# Patient Record
Sex: FEMALE | Race: WHITE | NOT HISPANIC OR LATINO | Employment: UNEMPLOYED | URBAN - NONMETROPOLITAN AREA
[De-identification: names, ages, dates, MRNs, and addresses within clinical notes are randomized per-mention and may not be internally consistent; named-entity substitution may affect disease eponyms.]

---

## 2017-01-11 ENCOUNTER — OFFICE VISIT (OUTPATIENT)
Dept: FAMILY MEDICINE CLINIC | Facility: CLINIC | Age: 52
End: 2017-01-11

## 2017-01-11 VITALS
HEART RATE: 97 BPM | HEIGHT: 66 IN | BODY MASS INDEX: 25.39 KG/M2 | TEMPERATURE: 97.5 F | OXYGEN SATURATION: 98 % | WEIGHT: 158 LBS | SYSTOLIC BLOOD PRESSURE: 114 MMHG | DIASTOLIC BLOOD PRESSURE: 70 MMHG

## 2017-01-11 DIAGNOSIS — F32.A DEPRESSION, UNSPECIFIED DEPRESSION TYPE: ICD-10-CM

## 2017-01-11 DIAGNOSIS — G47.00 INSOMNIA, UNSPECIFIED TYPE: ICD-10-CM

## 2017-01-11 DIAGNOSIS — F41.9 ANXIETY: Primary | ICD-10-CM

## 2017-01-11 PROCEDURE — 99213 OFFICE O/P EST LOW 20 MIN: CPT | Performed by: NURSE PRACTITIONER

## 2017-01-11 RX ORDER — QUETIAPINE FUMARATE 50 MG/1
100 TABLET, EXTENDED RELEASE ORAL NIGHTLY
Qty: 60 EACH | Refills: 1 | Status: SHIPPED | OUTPATIENT
Start: 2017-01-11 | End: 2017-03-01

## 2017-01-11 NOTE — MR AVS SNAPSHOT
Blaire Sullvian   1/11/2017 2:30 PM   Office Visit    Dept Phone:  [phone]   Encounter #:  [id number]    Provider:  ALBERTO Morgan   Department:  [facility]                Your Full Care Plan              Today's Medication Changes          These changes are accurate as of: 1/11/17  3:19 PM.  If you have any questions, ask your nurse or doctor.               New Medication(s)Ordered:     QUEtiapine fumarate ER 50 MG tablet sustained-release 24 hour tablet   Commonly known as:  SEROQUEL XR   Take 2 tablets by mouth Every Night.   Started by:  ALBERTO Morgan            Where to Get Your Medications      These medications were sent to [facility] [address] - [phone] [facility] [phone] [address] [address]     Phone:  [phone]     QUEtiapine fumarate ER 50 MG tablet sustained-release 24 hour tablet    Vortioxetine HBr 20 MG tablet                  Your Updated Medication List          This list is accurate as of: 1/11/17  3:19 PM.  Always use your most recent med list.                ALPRAZolam 1 MG tablet   Commonly known as:  XANAX   Take 1 tablet by mouth Every Night.       atenolol 50 MG tablet   Commonly known as:  TENORMIN   Take 1 tablet by mouth daily.       busPIRone 5 MG tablet   Commonly known as:  BUSPAR   Take 1 tablet by mouth 3 (three) times a day.       cyclobenzaprine 10 MG tablet   Commonly known as:  FLEXERIL       HYDROcodone-acetaminophen 7.5-325 MG per tablet   Commonly known as:  NORCO   Take 1 tablet by mouth Every 4 (Four) Hours As Needed for moderate pain (4-6).       meloxicam 15 MG tablet   Commonly known as:  MOBIC   Take 1 tablet by mouth daily.       pravastatin 80 MG tablet   Commonly known as:  PRAVACHOL   Take 1 tablet by mouth every night.       QUEtiapine fumarate ER 50 MG tablet sustained-release 24 hour tablet   Commonly known as:   SEROQUEL XR   Take 2 tablets by mouth Every Night.       rizatriptan 10 MG tablet   Commonly known as:  MAXALT   Take 1 tablet by mouth As Needed for migraine. May repeat in 2 hours if needed       triamcinolone 0.5 % cream   Commonly known as:  KENALOG       Vortioxetine HBr 20 MG tablet   Take 20 mg by mouth Daily.               You Were Diagnosed With        Codes Comments    Anxiety    -  Primary ICD-10-CM: F41.9  ICD-9-CM: 300.00     Depression, unspecified depression type     ICD-10-CM: F32.9  ICD-9-CM: 311     Insomnia, unspecified type     ICD-10-CM: G47.00  ICD-9-CM: 780.52       Instructions     None    Patient Instructions History      Upcoming Appointments     Visit Type Date Time Department    OFFICE VISIT 1/11/2017  2:30 PM [id number]    OFFICE VISIT 2/8/2017  1:30 PM [facility]    FOLLOW UP 3/10/2017  2:30 PM [id number]     Our records indicate that you have an active [employer] account.    You can view your After Visit Summary by going to [place] and logging in with your [employer] username and password.  If you don't have a [employer] username and password but a parent or guardian has access to your record, the parent or guardian should login with their own [employer] username and password and access your record to view the After Visit Summary.    If you have questions, you can email [email or web address] or call [phone] to talk to our [employer] staff.  Remember, [employer] is NOT to be used for urgent needs.  For medical emergencies, dial 911.               Other Info from Your Visit           Your Appointments     Feb 08, 2017  1:30 PM CST   Office Visit with ALBERTO Morgan   [facility] (--)    [address] [place] [id number] [phone]           Arrive 15 minutes prior to appointment.            Mar 10, 2017  2:30 PM CST   Follow Up with Lennox Mullins MD   [facility]  "[facility] (--)    [address] [place] [id number] [phone]           Arrive 15 minutes prior to appointment.              Allergies     Morphine  Nausea Only    Possible nausea    Morphine And Related      Penicillins  Other (See Comments)    Topamax [Topiramate]  Anxiety    Mental changes      Reason for Visit     Anxiety Follow up on Trintellix. Patient states medication is working for her.      Vital Signs     Blood Pressure Pulse Temperature Height    114/70 (BP Location: Left arm, Patient Position: Sitting, Cuff Size: Adult) 97 97.5 °F (36.4 °C) (Temporal Artery ) 66\" (167.6 cm)    Weight Oxygen Saturation Body Mass Index Smoking Status    158 lb (71.7 kg) 98% 25.5 kg/m2 Former Smoker      Problems and Diagnoses Noted     Anxiety problem    Depression    Difficulty falling or staying asleep        "

## 2017-01-11 NOTE — PROGRESS NOTES
"Chief Complaint   Patient presents with   • Anxiety     Follow up on Trintellix. Patient states medication is working for her.       Subjective   HPI:   Blaire Sullivan is a 51 y.o. female presents to the office for evaluation of:  Anxiety   Presents for follow-up visit. Symptoms include decreased concentration, depressed mood, excessive worry, insomnia, irritability and restlessness. Patient reports no chest pain, dizziness, feeling of choking, hyperventilation, nausea, nervous/anxious behavior, palpitations or suicidal ideas. Symptoms occur most days. The most recent episode lasted 2 days. The severity of symptoms is moderate and interfering with daily activities. The quality of sleep is poor. Nighttime awakenings: several.     Her past medical history is significant for depression. Compliance with medications is %.   Depression   Visit Type: follow-up  Patient presents with the following symptoms: decreased concentration, depressed mood, excessive worry, insomnia, irritability, memory impairment and restlessness.  Patient is not experiencing: chest pain, fatigue, feelings of hopelessness, feelings of worthlessness, hypersomnia, hyperventilation, nervousness/anxiety, palpitations, suicidal ideas, suicidal planning and thoughts of death.  Frequency of symptoms: most days   Severity: moderate   Sleep quality: poor  Nighttime awakenings: one to two  Compliance with medications:  %        She is happy with the progress she has made with her trintellex. She states the holidays were hard on her emotionally, and the anniversary of her mothers death was on 2017. She had to attend the  of her best friends mother who was buried on the same day as her mothers death, and at the same cemetary. She states this was difficult for her, but \"I made it through\". She states she thinks she needs a little more time to get over the saddness that came with the first holiday after losing her mother. She would " like to remain on the trintellix at the same dose for another month, and re-evaluate in 4 weeks. She does c/o insomnia X 1-2 weeks. She has taken seroquel in the past with good relief of insomnia. She is requesting to be restarted on this for a few weeks.     Family History   Problem Relation Age of Onset   • Coronary artery disease Other    • Peripheral vascular disease Mother    • Hypertension Mother    • Arthritis Mother    • Heart failure Mother    • Hyperlipidemia Mother    • Arthritis Father    • Alcohol abuse Father    • No Known Problems Sister      Social History     Social History   • Marital status:      Spouse name: Biju   • Number of children: 0   • Years of education: N/A     Occupational History   • Not on file.     Social History Main Topics   • Smoking status: Former Smoker     Types: Cigarettes   • Smokeless tobacco: Never Used      Comment: Quit apx in 2012   • Alcohol use Yes      Comment: Social   • Drug use: No   • Sexual activity: Yes     Partners: Male     Other Topics Concern   • Not on file     Social History Narrative       Review of Systems   Constitutional: Positive for irritability. Negative for activity change, chills, fatigue, fever and unexpected weight change.   HENT: Negative.  Negative for congestion, ear pain, postnasal drip, rhinorrhea, sinus pressure and sore throat.    Eyes: Negative.  Negative for pain and redness.   Respiratory: Negative.  Negative for cough, chest tightness and wheezing.    Cardiovascular: Negative.  Negative for chest pain, palpitations and leg swelling.   Gastrointestinal: Negative.  Negative for abdominal distention, abdominal pain, constipation, diarrhea, nausea and rectal pain.   Endocrine: Negative.    Genitourinary: Negative.  Negative for decreased urine volume, difficulty urinating, dysuria, flank pain, hematuria and urgency.   Musculoskeletal: Negative.  Negative for gait problem and neck pain.   Skin: Negative.  Negative for rash and  "wound.   Allergic/Immunologic: Negative.    Neurological: Negative.  Negative for dizziness, syncope, weakness, light-headedness, numbness and headaches.   Hematological: Negative.    Psychiatric/Behavioral: Positive for decreased concentration, dysphoric mood and sleep disturbance. Negative for agitation, behavioral problems, self-injury and suicidal ideas. The patient has insomnia. The patient is not nervous/anxious.      14 Point ROS completed with pertinent positives discussed. All other systems reviewed and are negative.     The following portions of the patient's history were reviewed and updated as appropriate: allergies, current medications, past family history, past medical history, past social history, past surgical history and problem list.    Encounter Vitals:  Vitals:    01/11/17 1440   BP: 114/70   BP Location: Left arm   Patient Position: Sitting   Cuff Size: Adult   Pulse: 97   Temp: 97.5 °F (36.4 °C)   TempSrc: Temporal Artery    SpO2: 98%   Weight: 158 lb (71.7 kg)   Height: 66\" (167.6 cm)   PainSc:   3   PainLoc: Back       Objective:  Physical Exam   Constitutional: She is oriented to person, place, and time. Vital signs are normal. She appears well-developed and well-nourished.   HENT:   Head: Normocephalic and atraumatic.   Right Ear: Tympanic membrane, external ear and ear canal normal.   Left Ear: Tympanic membrane, external ear and ear canal normal.   Nose: Nose normal.   Mouth/Throat: Uvula is midline, oropharynx is clear and moist and mucous membranes are normal. No posterior oropharyngeal edema or posterior oropharyngeal erythema.   Eyes: Lids are normal. Pupils are equal, round, and reactive to light.   Neck: Trachea normal and normal range of motion. Neck supple. No thyroid mass present.   Cardiovascular: Normal rate, regular rhythm, S1 normal, S2 normal, normal heart sounds and intact distal pulses.  Exam reveals no gallop and no friction rub.    No murmur heard.  Pulmonary/Chest: " Effort normal and breath sounds normal. No respiratory distress. She has no wheezes. She has no rales.   Abdominal: Soft. Normal appearance and bowel sounds are normal. She exhibits no mass. There is no rebound and no guarding.   Musculoskeletal: Normal range of motion.   Lymphadenopathy:     She has no cervical adenopathy.   Neurological: She is alert and oriented to person, place, and time. She has normal strength. No cranial nerve deficit or sensory deficit. Gait normal.   Skin: Skin is warm and dry. No rash noted.   Psychiatric: Her speech is normal and behavior is normal. Judgment and thought content normal. Cognition and memory are normal. She exhibits a depressed mood.   Nursing note and vitals reviewed.      Key Imaging/Tracings/POC Testing    Assessment and Plan:  Problem List Items Addressed This Visit        Other    Insomnia    Anxiety - Primary    Depression    Relevant Medications    Vortioxetine HBr 20 MG tablet    QUEtiapine fumarate ER (SEROQUEL XR) 50 MG tablet sustained-release 24 hour tablet        Blaire was seen today for anxiety.    Diagnoses and all orders for this visit:    Anxiety    Depression, unspecified depression type    Insomnia, unspecified type    Other orders  -     Vortioxetine HBr 20 MG tablet; Take 20 mg by mouth Daily.  -     QUEtiapine fumarate ER (SEROQUEL XR) 50 MG tablet sustained-release 24 hour tablet; Take 2 tablets by mouth Every Night.      Side effects of ordered medications discussed with patient.     Additional Notes/Instructions  Patient verbalizes promise to f/u in 3-4 weeks to re-evaluate depression, anxiety, and insomnia    Follow-Up  Return in about 4 weeks (around 2/8/2017), or if symptoms worsen or fail to improve.    Patient/caregiver verbalizes understanding of all orders and instructions in this plan of care.

## 2017-01-16 RX ORDER — HYDROCODONE BITARTRATE AND ACETAMINOPHEN 7.5; 325 MG/1; MG/1
1 TABLET ORAL EVERY 4 HOURS PRN
Qty: 120 TABLET | Refills: 0 | Status: SHIPPED | OUTPATIENT
Start: 2017-01-16 | End: 2017-02-27 | Stop reason: SDUPTHER

## 2017-01-17 RX ORDER — CYCLOBENZAPRINE HCL 10 MG
10 TABLET ORAL 3 TIMES DAILY PRN
Qty: 90 TABLET | Refills: 5 | Status: SHIPPED | OUTPATIENT
Start: 2017-01-17 | End: 2019-08-29

## 2017-02-20 RX ORDER — MELOXICAM 15 MG/1
15 TABLET ORAL DAILY
Qty: 30 TABLET | Refills: 5 | Status: SHIPPED | OUTPATIENT
Start: 2017-02-20 | End: 2017-02-28 | Stop reason: SDUPTHER

## 2017-02-20 RX ORDER — ATENOLOL 50 MG/1
50 TABLET ORAL DAILY
Qty: 30 TABLET | Refills: 5 | Status: SHIPPED | OUTPATIENT
Start: 2017-02-20 | End: 2017-02-28 | Stop reason: SDUPTHER

## 2017-02-27 RX ORDER — HYDROCODONE BITARTRATE AND ACETAMINOPHEN 7.5; 325 MG/1; MG/1
1 TABLET ORAL EVERY 4 HOURS PRN
Qty: 120 TABLET | Refills: 0 | Status: SHIPPED | OUTPATIENT
Start: 2017-02-27 | End: 2017-04-03 | Stop reason: SDUPTHER

## 2017-02-28 RX ORDER — MELOXICAM 15 MG/1
15 TABLET ORAL DAILY
Qty: 30 TABLET | Refills: 5 | Status: SHIPPED | OUTPATIENT
Start: 2017-02-28 | End: 2017-09-13 | Stop reason: SDUPTHER

## 2017-02-28 RX ORDER — ATENOLOL 50 MG/1
50 TABLET ORAL DAILY
Qty: 30 TABLET | Refills: 5 | Status: SHIPPED | OUTPATIENT
Start: 2017-02-28 | End: 2017-09-13 | Stop reason: SDUPTHER

## 2017-03-01 ENCOUNTER — OFFICE VISIT (OUTPATIENT)
Dept: FAMILY MEDICINE CLINIC | Facility: CLINIC | Age: 52
End: 2017-03-01

## 2017-03-01 VITALS
HEART RATE: 98 BPM | HEIGHT: 66 IN | WEIGHT: 160 LBS | TEMPERATURE: 95.4 F | BODY MASS INDEX: 25.71 KG/M2 | SYSTOLIC BLOOD PRESSURE: 112 MMHG | DIASTOLIC BLOOD PRESSURE: 80 MMHG | OXYGEN SATURATION: 96 %

## 2017-03-01 DIAGNOSIS — G47.00 INSOMNIA, UNSPECIFIED TYPE: ICD-10-CM

## 2017-03-01 DIAGNOSIS — F41.9 ANXIETY: ICD-10-CM

## 2017-03-01 DIAGNOSIS — F32.A DEPRESSION, UNSPECIFIED DEPRESSION TYPE: Primary | ICD-10-CM

## 2017-03-01 PROCEDURE — 99213 OFFICE O/P EST LOW 20 MIN: CPT | Performed by: NURSE PRACTITIONER

## 2017-03-01 RX ORDER — ARIPIPRAZOLE 2 MG/1
2 TABLET ORAL DAILY
Qty: 30 TABLET | Refills: 1 | Status: SHIPPED | OUTPATIENT
Start: 2017-03-01 | End: 2017-03-15 | Stop reason: DRUGHIGH

## 2017-03-01 RX ORDER — ALPRAZOLAM 2 MG/1
2 TABLET ORAL NIGHTLY PRN
Qty: 30 TABLET | Refills: 2 | Status: SHIPPED | OUTPATIENT
Start: 2017-03-01 | End: 2017-04-03 | Stop reason: SDUPTHER

## 2017-03-01 RX ORDER — HYDROXYZINE PAMOATE 50 MG/1
50 CAPSULE ORAL NIGHTLY
Qty: 30 CAPSULE | Refills: 1 | Status: SHIPPED | OUTPATIENT
Start: 2017-03-01 | End: 2017-04-28 | Stop reason: SDUPTHER

## 2017-03-01 NOTE — PROGRESS NOTES
"Chief Complaint   Patient presents with   • Anxiety     f/u   • Depression     f/u       Subjective   HPI   Blaire Sullivan is a 51 y.o. female presents to the office for evaluation of:  Anxiety   Presents for follow-up visit. Symptoms include depressed mood, excessive worry, insomnia, irritability and nervous/anxious behavior. Patient reports no chest pain, compulsions, confusion, decreased concentration, dizziness, dry mouth, feeling of choking, hyperventilation, impotence, malaise, muscle tension, nausea, obsessions, palpitations, panic, restlessness, shortness of breath or suicidal ideas. Symptoms occur most days. The severity of symptoms is causing significant distress. The quality of sleep is poor. Nighttime awakenings: several.     Her past medical history is significant for depression. Compliance with medications is %.   Depression   Visit Type: follow-up  Patient presents with the following symptoms: depressed mood, excessive worry, fatigue, insomnia, irritability and nervousness/anxiety.  Patient is not experiencing: anhedonia, chest pain, choking sensation, compulsions, confusion, decreased concentration, dizziness, dry mouth, feelings of worthlessness, hyperventilation, impotence, malaise, muscle tension, nausea, obsessions, palpitations, panic, restlessness, shortness of breath, suicidal ideas, suicidal planning, thoughts of death, weight gain and weight loss.  Frequency of symptoms: most days   Severity: causing significant distress   Sleep per night: 4 hours  Sleep quality: poor  Nighttime awakenings: several  Compliance with medications:  %  Side effects:  Insomnia    She would like to change her antidepressant medication. She expresses concern that her trintellex is no longer working.  She states she feels \"down\" most days.     Family History   Problem Relation Age of Onset   • Coronary artery disease Other    • Peripheral vascular disease Mother    • Hypertension Mother    • " Arthritis Mother    • Heart failure Mother    • Hyperlipidemia Mother    • Arthritis Father    • Alcohol abuse Father    • No Known Problems Sister      Social History     Social History   • Marital status:      Spouse name: Biju   • Number of children: 0   • Years of education: N/A     Occupational History   • Not on file.     Social History Main Topics   • Smoking status: Former Smoker     Types: Cigarettes   • Smokeless tobacco: Never Used      Comment: Quit apx in 2012   • Alcohol use Yes      Comment: Social   • Drug use: No   • Sexual activity: Yes     Partners: Male     Other Topics Concern   • Not on file     Social History Narrative       Review of Systems   Constitutional: Positive for irritability. Negative for activity change, chills, fatigue, fever, unexpected weight change, weight gain and weight loss.   HENT: Negative.  Negative for congestion, ear pain, postnasal drip, rhinorrhea, sinus pressure and sore throat.    Eyes: Negative.  Negative for pain and redness.   Respiratory: Negative.  Negative for cough, choking, chest tightness, shortness of breath and wheezing.    Cardiovascular: Negative.  Negative for chest pain, palpitations and leg swelling.   Gastrointestinal: Negative.  Negative for abdominal distention, abdominal pain, constipation, diarrhea, nausea and rectal pain.   Endocrine: Negative.    Genitourinary: Negative.  Negative for decreased urine volume, difficulty urinating, dysuria, flank pain, hematuria, impotence and urgency.   Musculoskeletal: Negative.  Negative for gait problem and neck pain.   Skin: Negative.  Negative for rash and wound.   Allergic/Immunologic: Negative.    Neurological: Negative.  Negative for dizziness, syncope, weakness, light-headedness, numbness and headaches.   Hematological: Negative.    Psychiatric/Behavioral: Positive for dysphoric mood. Negative for agitation, behavioral problems, confusion, decreased concentration, self-injury, sleep  "disturbance and suicidal ideas. The patient is nervous/anxious and has insomnia.      14 Point ROS completed with pertinent positives discussed. All other systems reviewed and are negative.     The following portions of the patient's history were reviewed and updated as appropriate: allergies, current medications, past family history, past medical history, past social history, past surgical history and problem list.    Encounter Vitals  Vitals:    03/01/17 1340   BP: 112/80   Pulse: 98   Temp: 95.4 °F (35.2 °C)   SpO2: 96%   Weight: 160 lb (72.6 kg)   Height: 66\" (167.6 cm)   PainSc:   3   PainLoc: Back       Objective:  Physical Exam   Constitutional: She is oriented to person, place, and time. Vital signs are normal. She appears well-developed and well-nourished.   HENT:   Head: Normocephalic and atraumatic.   Right Ear: Tympanic membrane, external ear and ear canal normal.   Left Ear: Tympanic membrane, external ear and ear canal normal.   Nose: Nose normal.   Mouth/Throat: Uvula is midline, oropharynx is clear and moist and mucous membranes are normal. No posterior oropharyngeal edema or posterior oropharyngeal erythema.   Eyes: Lids are normal. Pupils are equal, round, and reactive to light.   Neck: Trachea normal and normal range of motion. Neck supple. No thyroid mass present.   Cardiovascular: Normal rate, regular rhythm, S1 normal, S2 normal, normal heart sounds and intact distal pulses.  Exam reveals no gallop and no friction rub.    No murmur heard.  Pulmonary/Chest: Effort normal and breath sounds normal. No respiratory distress. She has no wheezes. She has no rales.   Abdominal: Soft. Normal appearance and bowel sounds are normal. She exhibits no mass. There is no rebound and no guarding.   Musculoskeletal: Normal range of motion.   Lymphadenopathy:     She has no cervical adenopathy.   Neurological: She is alert and oriented to person, place, and time. She has normal strength. No cranial nerve deficit " or sensory deficit. Gait normal.   Skin: Skin is warm and dry. No rash noted.   Psychiatric: Her speech is normal and behavior is normal. Judgment and thought content normal. Her mood appears anxious. Cognition and memory are normal. She exhibits a depressed mood.   Nursing note and vitals reviewed.    Pertinent Labs      Key Imaging/Tracings/POC Testing    Assessment and Plan  Blaire was seen today for anxiety and depression.    Diagnoses and all orders for this visit:    Depression, unspecified depression type    Insomnia, unspecified type    Anxiety    Other orders  -     ARIPiprazole (ABILIFY) 2 MG tablet; Take 1 tablet by mouth Daily.  -     hydrOXYzine (VISTARIL) 50 MG capsule; Take 1 capsule by mouth Every Night.      Side effects of ordered medications discussed with patient.     Additional Notes/Instructions  Stop seroquel  Increase xanax to 2 mg Q HS  Add abilify Q day  Vistaril to use Q HS if insomnia is not relieved by increase in xanax    F/u with Dr. Mullins in 2 weeks to re-assess depression and insomnia    Follow-Up  Return in about 2 weeks (around 3/15/2017), or if symptoms worsen or fail to improve.    Patient/caregiver verbalizes understanding of all orders and instructions in this plan of care.

## 2017-03-15 ENCOUNTER — OFFICE VISIT (OUTPATIENT)
Dept: FAMILY MEDICINE CLINIC | Facility: CLINIC | Age: 52
End: 2017-03-15

## 2017-03-15 VITALS
RESPIRATION RATE: 16 BRPM | BODY MASS INDEX: 25.71 KG/M2 | HEART RATE: 82 BPM | SYSTOLIC BLOOD PRESSURE: 112 MMHG | OXYGEN SATURATION: 98 % | DIASTOLIC BLOOD PRESSURE: 78 MMHG | TEMPERATURE: 98.4 F | HEIGHT: 66 IN | WEIGHT: 160 LBS

## 2017-03-15 DIAGNOSIS — F32.A DEPRESSION, UNSPECIFIED DEPRESSION TYPE: Primary | ICD-10-CM

## 2017-03-15 DIAGNOSIS — G43.809 OTHER TYPE OF MIGRAINE: ICD-10-CM

## 2017-03-15 PROCEDURE — 99213 OFFICE O/P EST LOW 20 MIN: CPT | Performed by: FAMILY MEDICINE

## 2017-03-15 PROCEDURE — 96372 THER/PROPH/DIAG INJ SC/IM: CPT | Performed by: FAMILY MEDICINE

## 2017-03-15 RX ORDER — ONDANSETRON 2 MG/ML
4 INJECTION INTRAMUSCULAR; INTRAVENOUS ONCE
Status: COMPLETED | OUTPATIENT
Start: 2017-03-15 | End: 2017-03-15

## 2017-03-15 RX ORDER — ARIPIPRAZOLE 5 MG/1
5 TABLET ORAL DAILY
Qty: 30 TABLET | Refills: 3 | Status: SHIPPED | OUTPATIENT
Start: 2017-03-15 | End: 2017-08-01 | Stop reason: SDUPTHER

## 2017-03-15 RX ADMIN — ONDANSETRON 4 MG: 2 INJECTION INTRAMUSCULAR; INTRAVENOUS at 14:42

## 2017-03-15 NOTE — PROGRESS NOTES
Subjective   Blaire Sullivan is a 51 y.o. female.   Chief Complaint   Patient presents with   • Depression     2 week f/u from [facility]   • Headache     migraine x4 days        Depression   Visit Type: follow-up  Patient presents with the following symptoms: anhedonia, depressed mood, insomnia and nervousness/anxiety.  Patient is not experiencing: suicidal ideas.  Frequency of symptoms: most days   Severity: mild   Sleep quality: good  Nighttime awakenings: occasional  Compliance with medications:  %  Side effects:  Headaches  Headache    This is a chronic problem. The current episode started in the past 7 days. The problem occurs daily. The problem has been waxing and waning. The pain is located in the left unilateral region. The pain does not radiate. The pain quality is similar to prior headaches. The quality of the pain is described as stabbing. The pain is at a severity of 7/10. The pain is severe. Associated symptoms include insomnia, nausea, phonophobia, photophobia and vomiting. Nothing aggravates the symptoms. She has tried oral narcotics, darkened room and triptans for the symptoms. The treatment provided mild relief.        The following portions of the patient's history were reviewed and updated as appropriate: allergies, current medications, past family history, past medical history, past social history, past surgical history and problem list.    Review of Systems   Constitutional: Negative.    Eyes: Positive for photophobia.   Respiratory: Negative.    Cardiovascular: Negative.    Gastrointestinal: Positive for nausea and vomiting.   Endocrine: Negative.    Genitourinary: Negative.    Musculoskeletal: Negative.    Skin: Negative.    Allergic/Immunologic: Negative.    Neurological: Positive for headaches.   Hematological: Negative.    Psychiatric/Behavioral: Negative for suicidal ideas. The patient is nervous/anxious and has insomnia.    All other systems reviewed and are negative.      Objective    Physical Exam   Constitutional: She is oriented to person, place, and time. She appears well-developed and well-nourished.   HENT:   Head: Normocephalic and atraumatic.   Right Ear: External ear normal.   Left Ear: External ear normal.   Nose: Nose normal.   Mouth/Throat: Oropharynx is clear and moist.   Eyes: Conjunctivae and EOM are normal. Pupils are equal, round, and reactive to light.   Neck: Normal range of motion. Neck supple.   Cardiovascular: Normal rate, regular rhythm, normal heart sounds and intact distal pulses.  Exam reveals no gallop and no friction rub.    No murmur heard.  Pulmonary/Chest: Effort normal and breath sounds normal. She has no wheezes. She has no rales.   Abdominal: Soft. Bowel sounds are normal. She exhibits no mass. There is no tenderness. There is no rebound and no guarding.   Musculoskeletal: Normal range of motion.   Neurological: She is alert and oriented to person, place, and time. She has normal reflexes. No cranial nerve deficit. She exhibits normal muscle tone.   Skin: Skin is warm and dry. No rash noted.   Psychiatric: She has a normal mood and affect. Her behavior is normal. Judgment and thought content normal.   Nursing note and vitals reviewed.      Assessment/Plan   Blaire was seen today for depression and headache.    Diagnoses and all orders for this visit:    Depression, unspecified depression type    Other type of migraine  -     ketorolac (TORADOL) injection 60 mg; Inject 2 mL into the shoulder, thigh, or buttocks 1 (One) Time.  -     ondansetron (ZOFRAN) injection 4 mg; Inject 2 mL into the shoulder, thigh, or buttocks 1 (One) Time.    Other orders  -     ARIPiprazole (ABILIFY) 5 MG tablet; Take 1 tablet by mouth Daily.

## 2017-03-29 RX ORDER — BUSPIRONE HYDROCHLORIDE 5 MG/1
5 TABLET ORAL 3 TIMES DAILY
Qty: 90 TABLET | Refills: 5 | Status: SHIPPED | OUTPATIENT
Start: 2017-03-29 | End: 2017-10-09 | Stop reason: SDUPTHER

## 2017-03-29 RX ORDER — PRAVASTATIN SODIUM 80 MG/1
80 TABLET ORAL NIGHTLY
Qty: 30 TABLET | Refills: 5 | Status: SHIPPED | OUTPATIENT
Start: 2017-03-29 | End: 2017-09-29 | Stop reason: SDUPTHER

## 2017-04-03 ENCOUNTER — OFFICE VISIT (OUTPATIENT)
Dept: FAMILY MEDICINE CLINIC | Facility: CLINIC | Age: 52
End: 2017-04-03

## 2017-04-03 VITALS
HEART RATE: 84 BPM | TEMPERATURE: 98.2 F | HEIGHT: 66 IN | SYSTOLIC BLOOD PRESSURE: 112 MMHG | DIASTOLIC BLOOD PRESSURE: 84 MMHG | BODY MASS INDEX: 25.71 KG/M2 | WEIGHT: 160 LBS

## 2017-04-03 DIAGNOSIS — F32.A DEPRESSION, UNSPECIFIED DEPRESSION TYPE: ICD-10-CM

## 2017-04-03 DIAGNOSIS — M16.9 OSTEOARTHRITIS OF HIP, UNSPECIFIED LATERALITY, UNSPECIFIED OSTEOARTHRITIS TYPE: Primary | ICD-10-CM

## 2017-04-03 DIAGNOSIS — E78.5 HYPERLIPIDEMIA, UNSPECIFIED HYPERLIPIDEMIA TYPE: ICD-10-CM

## 2017-04-03 DIAGNOSIS — F41.9 ANXIETY: ICD-10-CM

## 2017-04-03 PROCEDURE — 99214 OFFICE O/P EST MOD 30 MIN: CPT | Performed by: FAMILY MEDICINE

## 2017-04-03 RX ORDER — ALPRAZOLAM 2 MG/1
2 TABLET ORAL NIGHTLY PRN
Qty: 30 TABLET | Refills: 2 | Status: SHIPPED | OUTPATIENT
Start: 2017-04-03 | End: 2017-07-14 | Stop reason: DRUGHIGH

## 2017-04-03 RX ORDER — HYDROCODONE BITARTRATE AND ACETAMINOPHEN 7.5; 325 MG/1; MG/1
1 TABLET ORAL EVERY 4 HOURS PRN
Qty: 120 TABLET | Refills: 0 | Status: SHIPPED | OUTPATIENT
Start: 2017-04-03 | End: 2017-05-08 | Stop reason: SDUPTHER

## 2017-04-03 NOTE — PROGRESS NOTES
Subjective   Blaire Sullivan is a 51 y.o. female who presents to the office for follow-up   History of Present Illness   Patient with history of depression, anxiety, and osteoarthritis is in today for reevaluation and refill of controlled medications.  Patient states that medications are still effective providing at least 30% relief for pain and she is tolerating without side effect.  She is requesting some lab work being done for her hyperlipidemia.  She denies chest pain PND orthopnea.  No neurological symptoms  The following portions of the patient's history were reviewed and updated as appropriate: allergies, current medications, past family history, past medical history, past social history, past surgical history and problem list.    Review of Systems   Constitutional: Positive for fatigue.   HENT: Negative.    Eyes: Negative.    Respiratory: Negative.    Cardiovascular: Negative.    Gastrointestinal: Negative.    Endocrine: Negative.    Genitourinary: Negative.    Musculoskeletal: Positive for arthralgias.   Skin: Negative.    Allergic/Immunologic: Negative.    Neurological: Negative.    Hematological: Negative.    Psychiatric/Behavioral: Positive for dysphoric mood. The patient is nervous/anxious.    All other systems reviewed and are negative.      Objective   Physical Exam   Constitutional: She is oriented to person, place, and time. She appears well-developed and well-nourished.   HENT:   Head: Normocephalic and atraumatic.   Right Ear: External ear normal.   Left Ear: External ear normal.   Nose: Nose normal.   Mouth/Throat: Oropharynx is clear and moist.   Eyes: Conjunctivae and EOM are normal. Pupils are equal, round, and reactive to light.   Neck: Normal range of motion. Neck supple.   Cardiovascular: Normal rate, regular rhythm, normal heart sounds and intact distal pulses.  Exam reveals no gallop and no friction rub.    No murmur heard.  Pulmonary/Chest: Effort normal and breath sounds normal. She  has no wheezes. She has no rales.   Abdominal: Soft. Bowel sounds are normal. She exhibits no mass. There is no tenderness. There is no rebound and no guarding.   Musculoskeletal:        Right hip: She exhibits decreased range of motion and laceration.        Left hip: She exhibits decreased range of motion and laceration.   Neurological: She is alert and oriented to person, place, and time. She has normal reflexes. No cranial nerve deficit. She exhibits normal muscle tone.   Skin: Skin is warm and dry. No rash noted.   Psychiatric: Her behavior is normal. Judgment and thought content normal. Her mood appears anxious.   Nursing note and vitals reviewed.    renay reviewed    Assessment/Plan   Blaire was seen today for med refill, osteoarthritis and anxiety.    Diagnoses and all orders for this visit:    Osteoarthritis of hip, unspecified laterality, unspecified osteoarthritis type  -     CBC & Differential; Future  -     Urine Drug Screen  -     Vitamin D 25 Hydroxy; Future    Anxiety  -     Urine Drug Screen    Depression, unspecified depression type  -     TSH; Future    Hyperlipidemia, unspecified hyperlipidemia type  -     Comprehensive Metabolic Panel; Future  -     Lipid Panel; Future  -     TSH; Future  -     Vitamin D 25 Hydroxy; Future    Other orders  -     HYDROcodone-acetaminophen (NORCO) 7.5-325 MG per tablet; Take 1 tablet by mouth Every 4 (Four) Hours As Needed for Moderate Pain (4-6).  -     ALPRAZolam (XANAX) 2 MG tablet; Take 1 tablet by mouth At Night As Needed for Anxiety.             No flowsheet data found.

## 2017-04-21 RX ORDER — RIZATRIPTAN BENZOATE 10 MG/1
10 TABLET ORAL AS NEEDED
Qty: 9 TABLET | Refills: 5 | Status: SHIPPED | OUTPATIENT
Start: 2017-04-21 | End: 2018-01-05 | Stop reason: SDUPTHER

## 2017-04-28 RX ORDER — HYDROXYZINE PAMOATE 50 MG/1
50 CAPSULE ORAL NIGHTLY
Qty: 30 CAPSULE | Refills: 1 | Status: SHIPPED | OUTPATIENT
Start: 2017-04-28 | End: 2017-06-29 | Stop reason: SDUPTHER

## 2017-05-01 ENCOUNTER — OFFICE VISIT (OUTPATIENT)
Dept: FAMILY MEDICINE CLINIC | Facility: CLINIC | Age: 52
End: 2017-05-01

## 2017-05-01 VITALS
WEIGHT: 168 LBS | DIASTOLIC BLOOD PRESSURE: 62 MMHG | SYSTOLIC BLOOD PRESSURE: 118 MMHG | TEMPERATURE: 97.2 F | OXYGEN SATURATION: 97 % | BODY MASS INDEX: 27 KG/M2 | HEIGHT: 66 IN | RESPIRATION RATE: 16 BRPM | HEART RATE: 92 BPM

## 2017-05-01 DIAGNOSIS — F41.9 ANXIETY: ICD-10-CM

## 2017-05-01 DIAGNOSIS — F32.A DEPRESSION, UNSPECIFIED DEPRESSION TYPE: Primary | ICD-10-CM

## 2017-05-01 PROCEDURE — 99213 OFFICE O/P EST LOW 20 MIN: CPT | Performed by: FAMILY MEDICINE

## 2017-05-08 RX ORDER — HYDROCODONE BITARTRATE AND ACETAMINOPHEN 7.5; 325 MG/1; MG/1
1 TABLET ORAL EVERY 4 HOURS PRN
Qty: 120 TABLET | Refills: 0 | Status: SHIPPED | OUTPATIENT
Start: 2017-05-08 | End: 2017-06-12 | Stop reason: SDUPTHER

## 2017-06-12 RX ORDER — HYDROCODONE BITARTRATE AND ACETAMINOPHEN 7.5; 325 MG/1; MG/1
1 TABLET ORAL EVERY 4 HOURS PRN
Qty: 120 TABLET | Refills: 0 | Status: SHIPPED | OUTPATIENT
Start: 2017-06-12 | End: 2017-07-14 | Stop reason: SDUPTHER

## 2017-06-19 ENCOUNTER — OFFICE VISIT (OUTPATIENT)
Dept: FAMILY MEDICINE CLINIC | Facility: CLINIC | Age: 52
End: 2017-06-19

## 2017-06-19 VITALS
DIASTOLIC BLOOD PRESSURE: 90 MMHG | OXYGEN SATURATION: 92 % | HEART RATE: 76 BPM | WEIGHT: 170 LBS | RESPIRATION RATE: 18 BRPM | TEMPERATURE: 97.2 F | BODY MASS INDEX: 27.32 KG/M2 | SYSTOLIC BLOOD PRESSURE: 120 MMHG | HEIGHT: 66 IN

## 2017-06-19 DIAGNOSIS — M16.9 OSTEOARTHRITIS OF HIP, UNSPECIFIED LATERALITY, UNSPECIFIED OSTEOARTHRITIS TYPE: Primary | ICD-10-CM

## 2017-06-19 DIAGNOSIS — M51.35 DDD (DEGENERATIVE DISC DISEASE), THORACOLUMBAR: ICD-10-CM

## 2017-06-19 DIAGNOSIS — M17.0 OSTEOARTHRITIS OF BOTH KNEES, UNSPECIFIED OSTEOARTHRITIS TYPE: ICD-10-CM

## 2017-06-19 PROCEDURE — 99212 OFFICE O/P EST SF 10 MIN: CPT | Performed by: FAMILY MEDICINE

## 2017-06-19 NOTE — PROGRESS NOTES
Subjective   Blaire Sullivan is a 51 y.o. female.   Chief Complaint   Patient presents with   • paperwork     disability        History of Present Illness   Patient with history of osteoarthritis and degenerative disc disease in today for reevaluation.  She's requesting disability paperwork to be completed.    The following portions of the patient's history were reviewed and updated as appropriate: allergies, current medications, past family history, past medical history, past social history, past surgical history and problem list.    Review of Systems   Constitutional: Negative.    HENT: Negative.    Eyes: Negative.    Respiratory: Negative.    Cardiovascular: Negative.    Gastrointestinal: Negative.    Endocrine: Negative.    Genitourinary: Negative.    Musculoskeletal: Positive for arthralgias and back pain.   Skin: Negative.    Allergic/Immunologic: Negative.    Neurological: Negative.    Hematological: Negative.    Psychiatric/Behavioral: Negative for decreased concentration and suicidal ideas. The patient is nervous/anxious.    All other systems reviewed and are negative.      Objective   Physical Exam   Constitutional: She is oriented to person, place, and time. She appears well-developed and well-nourished.   HENT:   Head: Normocephalic and atraumatic.   Right Ear: External ear normal.   Left Ear: External ear normal.   Nose: Nose normal.   Mouth/Throat: Oropharynx is clear and moist.   Eyes: Conjunctivae and EOM are normal. Pupils are equal, round, and reactive to light.   Neck: Normal range of motion. Neck supple.   Cardiovascular: Normal rate, regular rhythm, normal heart sounds and intact distal pulses.  Exam reveals no gallop and no friction rub.    No murmur heard.  Pulmonary/Chest: Effort normal and breath sounds normal. She has no wheezes. She has no rales.   Abdominal: Soft. Bowel sounds are normal. She exhibits no mass. There is no tenderness. There is no rebound and no guarding.    Musculoskeletal:        Right hip: She exhibits decreased range of motion and laceration.        Left hip: She exhibits laceration.        Right knee: Tenderness found.        Left knee: Tenderness found.        Lumbar back: She exhibits decreased range of motion, tenderness and laceration.   Neurological: She is alert and oriented to person, place, and time. She has normal reflexes. No cranial nerve deficit. She exhibits normal muscle tone.   Skin: Skin is warm and dry. No rash noted.   Psychiatric: She has a normal mood and affect. Her behavior is normal. Judgment and thought content normal.   Nursing note and vitals reviewed.      Assessment/Plan   Blaire was seen today for paperwork.    Diagnoses and all orders for this visit:    Osteoarthritis of hip, unspecified laterality, unspecified osteoarthritis type    DDD (degenerative disc disease), thoracolumbar    Osteoarthritis of both knees, unspecified osteoarthritis type        Paperwork completed

## 2017-06-29 RX ORDER — HYDROXYZINE PAMOATE 50 MG/1
50 CAPSULE ORAL NIGHTLY
Qty: 30 CAPSULE | Refills: 5 | Status: SHIPPED | OUTPATIENT
Start: 2017-06-29 | End: 2017-08-07

## 2017-07-14 ENCOUNTER — OFFICE VISIT (OUTPATIENT)
Dept: FAMILY MEDICINE CLINIC | Facility: CLINIC | Age: 52
End: 2017-07-14

## 2017-07-14 VITALS
WEIGHT: 168 LBS | OXYGEN SATURATION: 92 % | TEMPERATURE: 96.5 F | HEART RATE: 78 BPM | BODY MASS INDEX: 27 KG/M2 | DIASTOLIC BLOOD PRESSURE: 80 MMHG | SYSTOLIC BLOOD PRESSURE: 112 MMHG | HEIGHT: 66 IN

## 2017-07-14 DIAGNOSIS — M16.9 OSTEOARTHRITIS OF HIP, UNSPECIFIED LATERALITY, UNSPECIFIED OSTEOARTHRITIS TYPE: Primary | ICD-10-CM

## 2017-07-14 DIAGNOSIS — G47.00 INSOMNIA, UNSPECIFIED TYPE: ICD-10-CM

## 2017-07-14 DIAGNOSIS — M17.0 OSTEOARTHRITIS OF BOTH KNEES, UNSPECIFIED OSTEOARTHRITIS TYPE: ICD-10-CM

## 2017-07-14 PROCEDURE — 99213 OFFICE O/P EST LOW 20 MIN: CPT | Performed by: FAMILY MEDICINE

## 2017-07-14 RX ORDER — ALPRAZOLAM 0.25 MG/1
0.25 TABLET ORAL NIGHTLY PRN
Qty: 30 TABLET | Refills: 0 | Status: SHIPPED | OUTPATIENT
Start: 2017-07-14 | End: 2017-08-07

## 2017-07-14 RX ORDER — HYDROCODONE BITARTRATE AND ACETAMINOPHEN 7.5; 325 MG/1; MG/1
1 TABLET ORAL EVERY 4 HOURS PRN
Qty: 120 TABLET | Refills: 0 | Status: SHIPPED | OUTPATIENT
Start: 2017-07-14 | End: 2017-08-14 | Stop reason: SDUPTHER

## 2017-07-14 NOTE — PROGRESS NOTES
Subjective   Blaire Sullivan is a 51 y.o. female.   Chief Complaint   Patient presents with   • Osteoarthritis     3 month f/u   • Med Refill     refill of hydrocodone         Arthritis   Presents for follow-up visit. She complains of pain. The symptoms have been stable. Affected locations include the right hip, left hip, right knee and left knee. Her pain is at a severity of 5/10. Associated symptoms include fatigue. Compliance with total regimen is %. Compliance with medications is %. Treatment side effects: none.        The following portions of the patient's history were reviewed and updated as appropriate: allergies, current medications, past family history, past medical history, past social history, past surgical history and problem list.    Review of Systems   Constitutional: Positive for fatigue.   HENT: Negative.    Eyes: Negative.    Respiratory: Negative.    Cardiovascular: Negative.    Gastrointestinal: Negative.    Endocrine: Negative.    Genitourinary: Negative.    Musculoskeletal: Positive for arthritis.   Skin: Negative.    Allergic/Immunologic: Negative.    Neurological: Negative.    Hematological: Negative.    Psychiatric/Behavioral: Negative.    All other systems reviewed and are negative.      Objective   Physical Exam   Constitutional: She is oriented to person, place, and time. She appears well-developed and well-nourished.   HENT:   Head: Normocephalic and atraumatic.   Right Ear: External ear normal.   Left Ear: External ear normal.   Nose: Nose normal.   Mouth/Throat: Oropharynx is clear and moist.   Eyes: Conjunctivae and EOM are normal. Pupils are equal, round, and reactive to light.   Neck: Normal range of motion. Neck supple.   Cardiovascular: Normal rate, regular rhythm, normal heart sounds and intact distal pulses.  Exam reveals no gallop and no friction rub.    No murmur heard.  Pulmonary/Chest: Effort normal and breath sounds normal. She has no wheezes. She has no  rales.   Abdominal: Soft. Bowel sounds are normal. She exhibits no mass. There is no tenderness. There is no rebound and no guarding.   Musculoskeletal:        Right hip: She exhibits decreased range of motion and laceration.        Left hip: She exhibits laceration.        Right knee: Tenderness found.        Left knee: Tenderness found.        Lumbar back: She exhibits decreased range of motion, tenderness and laceration.   Neurological: She is alert and oriented to person, place, and time. She has normal reflexes. No cranial nerve deficit. She exhibits normal muscle tone.   Skin: Skin is warm and dry. No rash noted.   Psychiatric: She has a normal mood and affect. Her behavior is normal. Judgment and thought content normal.   Nursing note and vitals reviewed.    Abdiel reviewed    Assessment/Plan   Blaire was seen today for osteoarthritis and med refill.    Diagnoses and all orders for this visit:    Osteoarthritis of hip, unspecified laterality, unspecified osteoarthritis type    Osteoarthritis of both knees, unspecified osteoarthritis type    Insomnia, unspecified type    Other orders  -     HYDROcodone-acetaminophen (NORCO) 7.5-325 MG per tablet; Take 1 tablet by mouth Every 4 (Four) Hours As Needed for Moderate Pain .  -     ALPRAZolam (XANAX) 0.25 MG tablet; Take 1 tablet by mouth At Night As Needed for Sleep.      Discussed interaction between opiates and benzodiazepines and have elected to taper off of the Xanax.

## 2017-08-01 RX ORDER — ARIPIPRAZOLE 5 MG/1
5 TABLET ORAL DAILY
Qty: 30 TABLET | Refills: 3 | Status: SHIPPED | OUTPATIENT
Start: 2017-08-01 | End: 2017-12-08 | Stop reason: SDUPTHER

## 2017-08-07 ENCOUNTER — OFFICE VISIT (OUTPATIENT)
Dept: FAMILY MEDICINE CLINIC | Facility: CLINIC | Age: 52
End: 2017-08-07

## 2017-08-07 VITALS
OXYGEN SATURATION: 95 % | DIASTOLIC BLOOD PRESSURE: 82 MMHG | TEMPERATURE: 97.6 F | RESPIRATION RATE: 16 BRPM | SYSTOLIC BLOOD PRESSURE: 122 MMHG | BODY MASS INDEX: 26.03 KG/M2 | HEIGHT: 66 IN | WEIGHT: 162 LBS | HEART RATE: 96 BPM

## 2017-08-07 DIAGNOSIS — F51.01 PRIMARY INSOMNIA: Primary | ICD-10-CM

## 2017-08-07 PROCEDURE — 99213 OFFICE O/P EST LOW 20 MIN: CPT | Performed by: FAMILY MEDICINE

## 2017-08-07 RX ORDER — MIRTAZAPINE 15 MG/1
15 TABLET, FILM COATED ORAL NIGHTLY
Qty: 30 TABLET | Refills: 3 | Status: SHIPPED | OUTPATIENT
Start: 2017-08-07 | End: 2017-11-27 | Stop reason: SDUPTHER

## 2017-08-07 NOTE — PROGRESS NOTES
Subjective   Blaire Sullivan is a 51 y.o. female.   Chief Complaint   Patient presents with   • Insomnia       Insomnia   This is a chronic problem. The current episode started more than 1 year ago. The problem occurs constantly. The problem has been gradually worsening. Associated symptoms include arthralgias and fatigue. Associated symptoms comments: depression. Exacerbated by: stopping Xanax.        The following portions of the patient's history were reviewed and updated as appropriate: allergies, current medications, past family history, past medical history, past social history, past surgical history and problem list.    Review of Systems   Constitutional: Positive for fatigue.   HENT: Negative.    Eyes: Negative.    Respiratory: Negative.    Cardiovascular: Negative.    Gastrointestinal: Negative.    Endocrine: Negative.    Genitourinary: Negative.    Musculoskeletal: Positive for arthralgias.   Skin: Negative.    Allergic/Immunologic: Negative.    Neurological: Negative.    Hematological: Negative.    Psychiatric/Behavioral: Positive for dysphoric mood and sleep disturbance. The patient is nervous/anxious and has insomnia.    All other systems reviewed and are negative.      Objective   Physical Exam   Constitutional: She is oriented to person, place, and time. She appears well-developed and well-nourished.   HENT:   Head: Normocephalic and atraumatic.   Right Ear: External ear normal.   Left Ear: External ear normal.   Nose: Nose normal.   Mouth/Throat: Oropharynx is clear and moist.   Eyes: Conjunctivae and EOM are normal. Pupils are equal, round, and reactive to light.   Neck: Normal range of motion. Neck supple.   Cardiovascular: Normal rate, regular rhythm, normal heart sounds and intact distal pulses.  Exam reveals no gallop and no friction rub.    No murmur heard.  Pulmonary/Chest: Effort normal and breath sounds normal. She has no wheezes. She has no rales.   Abdominal: Soft. Bowel sounds are  normal. She exhibits no mass. There is no tenderness. There is no rebound and no guarding.   Musculoskeletal:        Right hip: She exhibits decreased range of motion and laceration.        Left hip: She exhibits laceration.        Right knee: Tenderness found.        Left knee: Tenderness found.        Lumbar back: She exhibits decreased range of motion, tenderness and laceration.   Neurological: She is alert and oriented to person, place, and time. She has normal reflexes. No cranial nerve deficit. She exhibits normal muscle tone.   Skin: Skin is warm and dry. No rash noted.   Psychiatric: She has a normal mood and affect. Her behavior is normal. Judgment and thought content normal.   Nursing note and vitals reviewed.      Assessment/Plan   Blaire was seen today for insomnia.    Diagnoses and all orders for this visit:    Primary insomnia    Other orders  -     mirtazapine (REMERON) 15 MG tablet; Take 1 tablet by mouth Every Night.        Melatonin OTC

## 2017-08-14 RX ORDER — HYDROCODONE BITARTRATE AND ACETAMINOPHEN 7.5; 325 MG/1; MG/1
1 TABLET ORAL EVERY 4 HOURS PRN
Qty: 120 TABLET | Refills: 0 | Status: SHIPPED | OUTPATIENT
Start: 2017-08-14 | End: 2017-09-18 | Stop reason: SDUPTHER

## 2017-09-13 RX ORDER — ATENOLOL 50 MG/1
50 TABLET ORAL DAILY
Qty: 30 TABLET | Refills: 5 | Status: SHIPPED | OUTPATIENT
Start: 2017-09-13 | End: 2018-03-23 | Stop reason: SDUPTHER

## 2017-09-13 RX ORDER — MELOXICAM 15 MG/1
15 TABLET ORAL DAILY
Qty: 30 TABLET | Refills: 5 | Status: SHIPPED | OUTPATIENT
Start: 2017-09-13 | End: 2018-03-23 | Stop reason: SDUPTHER

## 2017-09-18 RX ORDER — HYDROCODONE BITARTRATE AND ACETAMINOPHEN 7.5; 325 MG/1; MG/1
1 TABLET ORAL EVERY 4 HOURS PRN
Qty: 120 TABLET | Refills: 0 | Status: SHIPPED | OUTPATIENT
Start: 2017-09-18 | End: 2017-10-18 | Stop reason: SDUPTHER

## 2017-09-29 RX ORDER — PRAVASTATIN SODIUM 80 MG/1
80 TABLET ORAL NIGHTLY
Qty: 30 TABLET | Refills: 5 | Status: SHIPPED | OUTPATIENT
Start: 2017-09-29 | End: 2018-04-20 | Stop reason: SDUPTHER

## 2017-10-09 RX ORDER — BUSPIRONE HYDROCHLORIDE 5 MG/1
5 TABLET ORAL 3 TIMES DAILY
Qty: 90 TABLET | Refills: 5 | Status: SHIPPED | OUTPATIENT
Start: 2017-10-09 | End: 2018-04-20 | Stop reason: SDUPTHER

## 2017-10-18 ENCOUNTER — OFFICE VISIT (OUTPATIENT)
Dept: FAMILY MEDICINE CLINIC | Facility: CLINIC | Age: 52
End: 2017-10-18

## 2017-10-18 VITALS
HEIGHT: 66 IN | SYSTOLIC BLOOD PRESSURE: 116 MMHG | HEART RATE: 81 BPM | OXYGEN SATURATION: 96 % | BODY MASS INDEX: 28.61 KG/M2 | TEMPERATURE: 96.8 F | RESPIRATION RATE: 16 BRPM | DIASTOLIC BLOOD PRESSURE: 80 MMHG | WEIGHT: 178 LBS

## 2017-10-18 DIAGNOSIS — M17.0 OSTEOARTHRITIS OF BOTH KNEES, UNSPECIFIED OSTEOARTHRITIS TYPE: ICD-10-CM

## 2017-10-18 DIAGNOSIS — M16.9 OSTEOARTHRITIS OF HIP, UNSPECIFIED LATERALITY, UNSPECIFIED OSTEOARTHRITIS TYPE: Primary | ICD-10-CM

## 2017-10-18 DIAGNOSIS — F41.9 ANXIETY: ICD-10-CM

## 2017-10-18 DIAGNOSIS — F32.A DEPRESSION, UNSPECIFIED DEPRESSION TYPE: ICD-10-CM

## 2017-10-18 PROCEDURE — 99213 OFFICE O/P EST LOW 20 MIN: CPT | Performed by: FAMILY MEDICINE

## 2017-10-18 RX ORDER — HYDROCODONE BITARTRATE AND ACETAMINOPHEN 7.5; 325 MG/1; MG/1
1 TABLET ORAL EVERY 4 HOURS PRN
Qty: 120 TABLET | Refills: 0 | Status: SHIPPED | OUTPATIENT
Start: 2017-10-18 | End: 2017-11-17 | Stop reason: SDUPTHER

## 2017-10-18 NOTE — PROGRESS NOTES
Subjective   Blaire Sullivan is a 52 y.o. female.   Chief Complaint   Patient presents with   • Osteoarthritis     3 mo control refill on Hydrocodone       Osteoarthritis   This is a chronic problem. The current episode started more than 1 year ago. The problem occurs daily. The problem has been unchanged. Associated symptoms include arthralgias, headaches and neck pain. The symptoms are aggravated by bending, exertion, standing and walking. She has tried NSAIDs and oral narcotics for the symptoms. The treatment provided significant relief.        The following portions of the patient's history were reviewed and updated as appropriate: allergies, current medications, past family history, past medical history, past social history, past surgical history and problem list.    Review of Systems   Constitutional: Negative.    Eyes: Negative.    Respiratory: Negative.    Cardiovascular: Negative.    Gastrointestinal: Negative.    Endocrine: Negative.    Genitourinary: Negative.    Musculoskeletal: Positive for arthralgias and neck pain.   Skin: Negative.    Allergic/Immunologic: Negative.    Neurological: Positive for headaches.   Hematological: Negative.    Psychiatric/Behavioral: Negative.    All other systems reviewed and are negative.      Objective   Physical Exam   Constitutional: She is oriented to person, place, and time. She appears well-developed and well-nourished.   HENT:   Head: Normocephalic and atraumatic.   Right Ear: External ear normal.   Left Ear: External ear normal.   Nose: Nose normal.   Mouth/Throat: Oropharynx is clear and moist.   Eyes: Conjunctivae and EOM are normal. Pupils are equal, round, and reactive to light.   Neck: Normal range of motion. Neck supple.   Cardiovascular: Normal rate, regular rhythm, normal heart sounds and intact distal pulses.  Exam reveals no gallop and no friction rub.    No murmur heard.  Pulmonary/Chest: Effort normal and breath sounds normal. She has no wheezes. She  has no rales.   Abdominal: Soft. Bowel sounds are normal. She exhibits no mass. There is no tenderness. There is no rebound and no guarding.   Musculoskeletal:        Right hip: She exhibits decreased range of motion and laceration.        Left hip: She exhibits laceration.        Right knee: Tenderness found.        Left knee: Tenderness found.        Lumbar back: She exhibits decreased range of motion, tenderness and laceration.   Neurological: She is alert and oriented to person, place, and time. She has normal reflexes. No cranial nerve deficit. She exhibits normal muscle tone.   Skin: Skin is warm and dry. No rash noted.   Psychiatric: She has a normal mood and affect. Her behavior is normal. Judgment and thought content normal.   Nursing note and vitals reviewed.  Abdiel reviewed    Assessment/Plan   Blaire was seen today for osteoarthritis.    Diagnoses and all orders for this visit:    Osteoarthritis of hip, unspecified laterality, unspecified osteoarthritis type    Osteoarthritis of both knees, unspecified osteoarthritis type    Depression, unspecified depression type    Anxiety    Other orders  -     HYDROcodone-acetaminophen (NORCO) 7.5-325 MG per tablet; Take 1 tablet by mouth Every 4 (Four) Hours As Needed for Moderate Pain .

## 2017-11-17 RX ORDER — HYDROCODONE BITARTRATE AND ACETAMINOPHEN 7.5; 325 MG/1; MG/1
1 TABLET ORAL EVERY 4 HOURS PRN
Qty: 120 TABLET | Refills: 0 | Status: SHIPPED | OUTPATIENT
Start: 2017-11-17 | End: 2017-12-20 | Stop reason: SDUPTHER

## 2017-11-27 RX ORDER — MIRTAZAPINE 15 MG/1
15 TABLET, FILM COATED ORAL NIGHTLY
Qty: 30 TABLET | Refills: 3 | Status: SHIPPED | OUTPATIENT
Start: 2017-11-27 | End: 2018-01-23 | Stop reason: DRUGHIGH

## 2017-12-08 RX ORDER — ARIPIPRAZOLE 5 MG/1
5 TABLET ORAL DAILY
Qty: 30 TABLET | Refills: 3 | Status: SHIPPED | OUTPATIENT
Start: 2017-12-08 | End: 2018-01-23 | Stop reason: DRUGHIGH

## 2017-12-20 RX ORDER — HYDROCODONE BITARTRATE AND ACETAMINOPHEN 7.5; 325 MG/1; MG/1
1 TABLET ORAL EVERY 4 HOURS PRN
Qty: 120 TABLET | Refills: 0 | Status: SHIPPED | OUTPATIENT
Start: 2017-12-20 | End: 2018-01-23 | Stop reason: SDUPTHER

## 2018-01-05 RX ORDER — RIZATRIPTAN BENZOATE 10 MG/1
10 TABLET ORAL AS NEEDED
Qty: 9 TABLET | Refills: 5 | Status: SHIPPED | OUTPATIENT
Start: 2018-01-05 | End: 2019-08-29 | Stop reason: SDUPTHER

## 2018-01-23 ENCOUNTER — OFFICE VISIT (OUTPATIENT)
Dept: FAMILY MEDICINE CLINIC | Facility: CLINIC | Age: 53
End: 2018-01-23

## 2018-01-23 VITALS
RESPIRATION RATE: 16 BRPM | HEIGHT: 66 IN | DIASTOLIC BLOOD PRESSURE: 68 MMHG | OXYGEN SATURATION: 97 % | HEART RATE: 80 BPM | SYSTOLIC BLOOD PRESSURE: 114 MMHG | TEMPERATURE: 97.7 F | WEIGHT: 183 LBS | BODY MASS INDEX: 29.41 KG/M2

## 2018-01-23 DIAGNOSIS — M17.0 OSTEOARTHRITIS OF BOTH KNEES, UNSPECIFIED OSTEOARTHRITIS TYPE: Primary | ICD-10-CM

## 2018-01-23 DIAGNOSIS — F32.A DEPRESSION, UNSPECIFIED DEPRESSION TYPE: ICD-10-CM

## 2018-01-23 DIAGNOSIS — F41.9 ANXIETY: ICD-10-CM

## 2018-01-23 PROCEDURE — 99213 OFFICE O/P EST LOW 20 MIN: CPT | Performed by: FAMILY MEDICINE

## 2018-01-23 RX ORDER — ARIPIPRAZOLE 10 MG/1
10 TABLET ORAL DAILY
Qty: 30 TABLET | Refills: 6 | Status: SHIPPED | OUTPATIENT
Start: 2018-01-23 | End: 2018-02-20 | Stop reason: DRUGHIGH

## 2018-01-23 RX ORDER — HYDROCODONE BITARTRATE AND ACETAMINOPHEN 7.5; 325 MG/1; MG/1
1 TABLET ORAL EVERY 4 HOURS PRN
Qty: 120 TABLET | Refills: 0 | Status: SHIPPED | OUTPATIENT
Start: 2018-01-23 | End: 2018-02-16 | Stop reason: SDUPTHER

## 2018-01-23 RX ORDER — MIRTAZAPINE 7.5 MG/1
7.5 TABLET, FILM COATED ORAL NIGHTLY
Qty: 30 TABLET | Refills: 6 | Status: SHIPPED | OUTPATIENT
Start: 2018-01-23 | End: 2018-01-24 | Stop reason: SDUPTHER

## 2018-01-23 NOTE — PROGRESS NOTES
Subjective   Blaire Sullivan is a 52 y.o. female.   Chief Complaint   Patient presents with   • Osteoarthritis     3 month control       Osteoarthritis   This is a chronic problem. The current episode started more than 1 year ago. The problem occurs daily. The problem has been unchanged. Associated symptoms include arthralgias, fatigue, headaches, myalgias and neck pain. The symptoms are aggravated by bending, standing and walking. She has tried NSAIDs and oral narcotics for the symptoms. The treatment provided significant relief.   Depression   Visit Type: follow-up  Patient presents with the following symptoms: anhedonia, depressed mood, fatigue, hypersomnia, malaise and weight gain.  Patient is not experiencing: nervousness/anxiety, panic, suicidal ideas and suicidal planning.  Frequency of symptoms: most days   Severity: causing significant distress   Compliance with medications:  %             The following portions of the patient's history were reviewed and updated as appropriate: allergies, current medications, past family history, past medical history, past social history, past surgical history and problem list.    Review of Systems   Constitutional: Positive for fatigue and weight gain.   Eyes: Negative.    Respiratory: Negative.    Cardiovascular: Negative.    Gastrointestinal: Negative.    Endocrine: Negative.    Genitourinary: Negative.    Musculoskeletal: Positive for arthralgias, myalgias and neck pain.   Skin: Negative.    Allergic/Immunologic: Negative.    Neurological: Positive for headaches.   Hematological: Negative.    Psychiatric/Behavioral: Negative.  Negative for suicidal ideas. The patient is not nervous/anxious.    All other systems reviewed and are negative.      Objective   Physical Exam   Constitutional: She is oriented to person, place, and time. She appears well-developed and well-nourished.   HENT:   Head: Normocephalic and atraumatic.   Right Ear: External ear normal.   Left  Ear: External ear normal.   Nose: Nose normal.   Mouth/Throat: Oropharynx is clear and moist.   Eyes: Conjunctivae and EOM are normal. Pupils are equal, round, and reactive to light.   Neck: Normal range of motion. Neck supple.   Cardiovascular: Normal rate, regular rhythm, normal heart sounds and intact distal pulses.  Exam reveals no gallop and no friction rub.    No murmur heard.  Pulmonary/Chest: Effort normal and breath sounds normal. She has no wheezes. She has no rales.   Abdominal: Soft. Bowel sounds are normal. She exhibits no mass. There is no tenderness. There is no rebound and no guarding.   Musculoskeletal:        Right hip: She exhibits decreased range of motion and laceration.        Left hip: She exhibits laceration.        Right knee: Tenderness found.        Left knee: Tenderness found.        Lumbar back: She exhibits decreased range of motion, tenderness and laceration.   Neurological: She is alert and oriented to person, place, and time. She has normal reflexes. No cranial nerve deficit. She exhibits normal muscle tone.   Skin: Skin is warm and dry. No rash noted.   Psychiatric: She has a normal mood and affect. Her behavior is normal. Judgment and thought content normal.   Nursing note and vitals reviewed.  renay reviewed    Assessment/Plan   Blaire was seen today for osteoarthritis.    Diagnoses and all orders for this visit:    Osteoarthritis of both knees, unspecified osteoarthritis type    Depression, unspecified depression type    Anxiety    Other orders  -     HYDROcodone-acetaminophen (NORCO) 7.5-325 MG per tablet; Take 1 tablet by mouth Every 4 (Four) Hours As Needed for Moderate Pain .  -     mirtazapine (REMERON) 7.5 MG tablet; Take 1 tablet by mouth Every Night.  -     ARIPiprazole (ABILIFY) 10 MG tablet; Take 1 tablet by mouth Daily.

## 2018-01-24 RX ORDER — MIRTAZAPINE 7.5 MG/1
7.5 TABLET, FILM COATED ORAL NIGHTLY
Qty: 30 TABLET | Refills: 5 | Status: SHIPPED | OUTPATIENT
Start: 2018-01-24 | End: 2018-06-01 | Stop reason: DRUGHIGH

## 2018-02-16 RX ORDER — HYDROCODONE BITARTRATE AND ACETAMINOPHEN 7.5; 325 MG/1; MG/1
1 TABLET ORAL EVERY 4 HOURS PRN
Qty: 120 TABLET | Refills: 0 | Status: SHIPPED | OUTPATIENT
Start: 2018-02-16 | End: 2018-03-16 | Stop reason: SDUPTHER

## 2018-02-20 ENCOUNTER — OFFICE VISIT (OUTPATIENT)
Dept: FAMILY MEDICINE CLINIC | Facility: CLINIC | Age: 53
End: 2018-02-20

## 2018-02-20 VITALS
RESPIRATION RATE: 14 BRPM | HEART RATE: 75 BPM | TEMPERATURE: 96.4 F | HEIGHT: 66 IN | WEIGHT: 183 LBS | SYSTOLIC BLOOD PRESSURE: 118 MMHG | DIASTOLIC BLOOD PRESSURE: 80 MMHG | BODY MASS INDEX: 29.41 KG/M2 | OXYGEN SATURATION: 96 %

## 2018-02-20 DIAGNOSIS — F41.9 ANXIETY: ICD-10-CM

## 2018-02-20 DIAGNOSIS — F33.1 MODERATE EPISODE OF RECURRENT MAJOR DEPRESSIVE DISORDER (HCC): Primary | ICD-10-CM

## 2018-02-20 DIAGNOSIS — F51.01 PRIMARY INSOMNIA: ICD-10-CM

## 2018-02-20 PROCEDURE — 99213 OFFICE O/P EST LOW 20 MIN: CPT | Performed by: FAMILY MEDICINE

## 2018-02-20 RX ORDER — ARIPIPRAZOLE 15 MG/1
15 TABLET ORAL DAILY
Qty: 30 TABLET | Refills: 6 | Status: SHIPPED | OUTPATIENT
Start: 2018-02-20 | End: 2018-03-20 | Stop reason: DRUGHIGH

## 2018-02-20 NOTE — PROGRESS NOTES
Subjective   Blaire Sullivan is a 52 y.o. female.   Chief Complaint   Patient presents with   • Depression     4 week follow up, patient wanting to go up on Abilify says its not helping       Depression   Visit Type: follow-up  Patient presents with the following symptoms: anhedonia, depressed mood, excessive worry and fatigue.  Patient is not experiencing: insomnia, nervousness/anxiety, panic, suicidal ideas, suicidal planning and thoughts of death.  Frequency of symptoms: most days   Severity: moderate   Sleep quality: good  Compliance with medications:  %  Side effects:  Weight gain       The following portions of the patient's history were reviewed and updated as appropriate: allergies, current medications, past family history, past medical history, past social history, past surgical history and problem list.    Review of Systems   Constitutional: Positive for fatigue.   Eyes: Negative.    Respiratory: Negative.    Cardiovascular: Negative.    Gastrointestinal: Negative.    Endocrine: Negative.    Genitourinary: Negative.    Musculoskeletal: Positive for arthralgias, myalgias and neck pain.   Skin: Negative.    Allergic/Immunologic: Negative.    Neurological: Positive for headaches.   Hematological: Negative.    Psychiatric/Behavioral: Negative.  Negative for suicidal ideas. The patient is not nervous/anxious and does not have insomnia.    All other systems reviewed and are negative.      Objective   Physical Exam   Constitutional: She is oriented to person, place, and time. She appears well-developed and well-nourished.   HENT:   Head: Normocephalic and atraumatic.   Right Ear: External ear normal.   Left Ear: External ear normal.   Nose: Nose normal.   Mouth/Throat: Oropharynx is clear and moist.   Eyes: Conjunctivae and EOM are normal. Pupils are equal, round, and reactive to light.   Neck: Normal range of motion. Neck supple.   Cardiovascular: Normal rate, regular rhythm, normal heart sounds and  intact distal pulses.  Exam reveals no gallop and no friction rub.    No murmur heard.  Pulmonary/Chest: Effort normal and breath sounds normal. She has no wheezes. She has no rales.   Abdominal: Soft. Bowel sounds are normal. She exhibits no mass. There is no tenderness. There is no rebound and no guarding.   Musculoskeletal:        Right hip: She exhibits decreased range of motion and laceration.        Left hip: She exhibits laceration.        Right knee: Tenderness found.        Left knee: Tenderness found.        Lumbar back: She exhibits decreased range of motion, tenderness and laceration.   Neurological: She is alert and oriented to person, place, and time. She has normal reflexes. No cranial nerve deficit. She exhibits normal muscle tone.   Skin: Skin is warm and dry. No rash noted.   Psychiatric: She has a normal mood and affect. Her behavior is normal. Judgment and thought content normal.   Nursing note and vitals reviewed.      Assessment/Plan   Blaire was seen today for depression.    Diagnoses and all orders for this visit:    Moderate episode of recurrent major depressive disorder    Anxiety    Primary insomnia    Other orders  -     ARIPiprazole (ABILIFY) 15 MG tablet; Take 1 tablet by mouth Daily.

## 2018-03-19 RX ORDER — HYDROCODONE BITARTRATE AND ACETAMINOPHEN 7.5; 325 MG/1; MG/1
1 TABLET ORAL EVERY 6 HOURS PRN
Qty: 120 TABLET | Refills: 0 | Status: SHIPPED | OUTPATIENT
Start: 2018-03-19 | End: 2018-04-18 | Stop reason: SDUPTHER

## 2018-03-20 ENCOUNTER — OFFICE VISIT (OUTPATIENT)
Dept: FAMILY MEDICINE CLINIC | Facility: CLINIC | Age: 53
End: 2018-03-20

## 2018-03-20 VITALS
DIASTOLIC BLOOD PRESSURE: 78 MMHG | TEMPERATURE: 97.5 F | HEIGHT: 66 IN | HEART RATE: 71 BPM | OXYGEN SATURATION: 93 % | WEIGHT: 189 LBS | SYSTOLIC BLOOD PRESSURE: 118 MMHG | BODY MASS INDEX: 30.37 KG/M2

## 2018-03-20 DIAGNOSIS — F41.9 ANXIETY: ICD-10-CM

## 2018-03-20 DIAGNOSIS — F33.1 MODERATE EPISODE OF RECURRENT MAJOR DEPRESSIVE DISORDER (HCC): Primary | ICD-10-CM

## 2018-03-20 PROCEDURE — 99213 OFFICE O/P EST LOW 20 MIN: CPT | Performed by: FAMILY MEDICINE

## 2018-03-20 RX ORDER — ARIPIPRAZOLE 20 MG/1
20 TABLET ORAL DAILY
Qty: 30 TABLET | Refills: 2 | Status: SHIPPED | OUTPATIENT
Start: 2018-03-20 | End: 2018-04-23 | Stop reason: DRUGHIGH

## 2018-03-20 NOTE — PROGRESS NOTES
Subjective   Blaire Sullivan is a 52 y.o. female.     Depression   Visit Type: follow-up  Patient presents with the following symptoms: anhedonia, depressed mood and malaise.  Patient is not experiencing: nervousness/anxiety, panic and suicidal ideas.  Frequency of symptoms: most days   Severity: moderate   Compliance with medications:  %  Treatment side effects: none.       The following portions of the patient's history were reviewed and updated as appropriate: allergies, current medications, past family history, past medical history, past social history, past surgical history and problem list.    Review of Systems   Constitutional: Positive for fatigue.   Eyes: Negative.    Respiratory: Negative.    Cardiovascular: Negative.    Gastrointestinal: Negative.    Endocrine: Negative.    Genitourinary: Negative.    Musculoskeletal: Positive for arthralgias, myalgias and neck pain.   Skin: Negative.    Allergic/Immunologic: Negative.    Neurological: Positive for headaches.   Hematological: Negative.    Psychiatric/Behavioral: Positive for dysphoric mood. Negative for suicidal ideas. The patient is not nervous/anxious.    All other systems reviewed and are negative.      Objective   Physical Exam   Constitutional: She is oriented to person, place, and time. She appears well-developed and well-nourished.   HENT:   Head: Normocephalic and atraumatic.   Right Ear: External ear normal.   Left Ear: External ear normal.   Nose: Nose normal.   Mouth/Throat: Oropharynx is clear and moist.   Eyes: Conjunctivae and EOM are normal. Pupils are equal, round, and reactive to light.   Neck: Normal range of motion. Neck supple.   Cardiovascular: Normal rate, regular rhythm, normal heart sounds and intact distal pulses.  Exam reveals no gallop and no friction rub.    No murmur heard.  Pulmonary/Chest: Effort normal and breath sounds normal. She has no wheezes. She has no rales.   Abdominal: Soft. Bowel sounds are normal. She  exhibits no mass. There is no tenderness. There is no rebound and no guarding.   Musculoskeletal:        Right hip: She exhibits decreased range of motion and laceration.        Left hip: She exhibits laceration.        Right knee: Tenderness found.        Left knee: Tenderness found.        Lumbar back: She exhibits decreased range of motion, tenderness and laceration.   Neurological: She is alert and oriented to person, place, and time. She has normal reflexes. No cranial nerve deficit. She exhibits normal muscle tone.   Skin: Skin is warm and dry. No rash noted.   Psychiatric: Her behavior is normal. Judgment and thought content normal. She exhibits a depressed mood.   Nursing note and vitals reviewed.      Assessment/Plan   Blaire was seen today for depressive disorder.    Diagnoses and all orders for this visit:    Moderate episode of recurrent major depressive disorder    Anxiety    Other orders  -     ARIPiprazole (ABILIFY) 20 MG tablet; Take 1 tablet by mouth Daily.

## 2018-03-23 RX ORDER — MELOXICAM 15 MG/1
15 TABLET ORAL DAILY
Qty: 30 TABLET | Refills: 1 | Status: SHIPPED | OUTPATIENT
Start: 2018-03-23 | End: 2018-05-30 | Stop reason: SDUPTHER

## 2018-03-23 RX ORDER — ATENOLOL 50 MG/1
50 TABLET ORAL DAILY
Qty: 30 TABLET | Refills: 1 | Status: SHIPPED | OUTPATIENT
Start: 2018-03-23 | End: 2018-05-30 | Stop reason: SDUPTHER

## 2018-04-18 ENCOUNTER — OFFICE VISIT (OUTPATIENT)
Dept: FAMILY MEDICINE CLINIC | Facility: CLINIC | Age: 53
End: 2018-04-18

## 2018-04-18 VITALS
HEART RATE: 78 BPM | RESPIRATION RATE: 14 BRPM | HEIGHT: 66 IN | DIASTOLIC BLOOD PRESSURE: 78 MMHG | OXYGEN SATURATION: 94 % | TEMPERATURE: 96.9 F | SYSTOLIC BLOOD PRESSURE: 136 MMHG

## 2018-04-18 DIAGNOSIS — M16.9 OSTEOARTHRITIS OF HIP, UNSPECIFIED LATERALITY, UNSPECIFIED OSTEOARTHRITIS TYPE: ICD-10-CM

## 2018-04-18 DIAGNOSIS — M17.0 OSTEOARTHRITIS OF BOTH KNEES, UNSPECIFIED OSTEOARTHRITIS TYPE: Primary | ICD-10-CM

## 2018-04-18 PROCEDURE — 99213 OFFICE O/P EST LOW 20 MIN: CPT | Performed by: FAMILY MEDICINE

## 2018-04-18 RX ORDER — HYDROCODONE BITARTRATE AND ACETAMINOPHEN 7.5; 325 MG/1; MG/1
1 TABLET ORAL EVERY 6 HOURS PRN
Qty: 120 TABLET | Refills: 0 | Status: SHIPPED | OUTPATIENT
Start: 2018-04-18 | End: 2018-05-21 | Stop reason: SDUPTHER

## 2018-04-18 NOTE — PROGRESS NOTES
Subjective   Blaire Sullivan is a 52 y.o. female.   Chief Complaint   Patient presents with   • Osteoarthritis     3 mo control hydrocodone       Osteoarthritis   This is a chronic problem. The current episode started more than 1 year ago. The problem occurs daily. The problem has been unchanged. Associated symptoms include arthralgias, fatigue, headaches, myalgias and neck pain. The symptoms are aggravated by bending, standing, walking and exertion. She has tried NSAIDs and oral narcotics for the symptoms. The treatment provided significant relief.        The following portions of the patient's history were reviewed and updated as appropriate: allergies, current medications, past family history, past medical history, past social history, past surgical history and problem list.    Review of Systems   Constitutional: Positive for fatigue.   Eyes: Negative.    Respiratory: Negative.    Cardiovascular: Negative.    Gastrointestinal: Negative.    Endocrine: Negative.    Genitourinary: Negative.    Musculoskeletal: Positive for arthralgias, myalgias and neck pain.   Skin: Negative.    Allergic/Immunologic: Negative.    Neurological: Positive for headaches.   Hematological: Negative.    Psychiatric/Behavioral: Positive for dysphoric mood. Negative for suicidal ideas. The patient is not nervous/anxious.    All other systems reviewed and are negative.      Objective   Physical Exam   Constitutional: She is oriented to person, place, and time. She appears well-developed and well-nourished.   HENT:   Head: Normocephalic and atraumatic.   Right Ear: External ear normal.   Left Ear: External ear normal.   Nose: Nose normal.   Mouth/Throat: Oropharynx is clear and moist.   Eyes: Conjunctivae and EOM are normal. Pupils are equal, round, and reactive to light.   Neck: Normal range of motion. Neck supple.   Cardiovascular: Normal rate, regular rhythm, normal heart sounds and intact distal pulses.  Exam reveals no gallop and no  friction rub.    No murmur heard.  Pulmonary/Chest: Effort normal and breath sounds normal. She has no wheezes. She has no rales.   Abdominal: Soft. Bowel sounds are normal. She exhibits no mass. There is no tenderness. There is no rebound and no guarding.   Musculoskeletal:        Right hip: She exhibits decreased range of motion and laceration.        Left hip: She exhibits laceration.        Right knee: Tenderness found.        Left knee: Tenderness found.        Lumbar back: She exhibits decreased range of motion, tenderness and laceration.   Neurological: She is alert and oriented to person, place, and time. She has normal reflexes. No cranial nerve deficit. She exhibits normal muscle tone.   Skin: Skin is warm and dry. No rash noted.   Psychiatric: Her behavior is normal. Judgment and thought content normal. She exhibits a depressed mood.   Nursing note and vitals reviewed.  renay reviewed    Assessment/Plan   Blaire was seen today for osteoarthritis.    Diagnoses and all orders for this visit:    Osteoarthritis of both knees, unspecified osteoarthritis type  -     Pain Management Profile (13 Drugs) Urine - Urine, Clean Catch; Future    Osteoarthritis of hip, unspecified laterality, unspecified osteoarthritis type  -     Pain Management Profile (13 Drugs) Urine - Urine, Clean Catch; Future    Other orders  -     HYDROcodone-acetaminophen (NORCO) 7.5-325 MG per tablet; Take 1 tablet by mouth Every 6 (Six) Hours As Needed for Moderate Pain .

## 2018-04-20 RX ORDER — PRAVASTATIN SODIUM 80 MG/1
80 TABLET ORAL NIGHTLY
Qty: 30 TABLET | Refills: 0 | Status: SHIPPED | OUTPATIENT
Start: 2018-04-20 | End: 2018-05-22 | Stop reason: SDUPTHER

## 2018-04-20 RX ORDER — BUSPIRONE HYDROCHLORIDE 5 MG/1
5 TABLET ORAL 3 TIMES DAILY
Qty: 90 TABLET | Refills: 0 | Status: SHIPPED | OUTPATIENT
Start: 2018-04-20 | End: 2018-05-22 | Stop reason: SDUPTHER

## 2018-04-23 ENCOUNTER — OFFICE VISIT (OUTPATIENT)
Dept: FAMILY MEDICINE CLINIC | Facility: CLINIC | Age: 53
End: 2018-04-23

## 2018-04-23 VITALS
DIASTOLIC BLOOD PRESSURE: 82 MMHG | RESPIRATION RATE: 14 BRPM | HEART RATE: 72 BPM | OXYGEN SATURATION: 94 % | HEIGHT: 66 IN | SYSTOLIC BLOOD PRESSURE: 122 MMHG | TEMPERATURE: 96.9 F

## 2018-04-23 DIAGNOSIS — F41.9 ANXIETY: ICD-10-CM

## 2018-04-23 DIAGNOSIS — F33.1 MODERATE EPISODE OF RECURRENT MAJOR DEPRESSIVE DISORDER (HCC): Primary | ICD-10-CM

## 2018-04-23 PROCEDURE — G0480 DRUG TEST DEF 1-7 CLASSES: HCPCS

## 2018-04-23 PROCEDURE — 80307 DRUG TEST PRSMV CHEM ANLYZR: CPT | Performed by: FAMILY MEDICINE

## 2018-04-23 PROCEDURE — 99213 OFFICE O/P EST LOW 20 MIN: CPT | Performed by: FAMILY MEDICINE

## 2018-04-23 RX ORDER — ARIPIPRAZOLE 30 MG/1
30 TABLET ORAL DAILY
Qty: 30 TABLET | Refills: 3 | Status: SHIPPED | OUTPATIENT
Start: 2018-04-23 | End: 2018-06-25

## 2018-04-23 NOTE — PROGRESS NOTES
Subjective   Blaire Sullivan is a 52 y.o. female.   Chief Complaint   Patient presents with   • Depression     1 mo f/u on Abilify, patient thinks she may need a dose adjustment        Depression   Visit Type: follow-up  Patient presents with the following symptoms: nervousness/anxiety.  Patient is not experiencing: anhedonia, depressed mood, excessive worry, feelings of hopelessness, feelings of worthlessness, insomnia, panic, suicidal ideas and suicidal planning.  Frequency of symptoms: most days   Severity: moderate   Sleep quality: good  Compliance with medications:  %  Treatment side effects: none.       The following portions of the patient's history were reviewed and updated as appropriate: allergies, current medications, past family history, past medical history, past social history, past surgical history and problem list.    Review of Systems   Constitutional: Positive for fatigue.   Eyes: Negative.    Respiratory: Negative.    Cardiovascular: Negative.    Gastrointestinal: Negative.    Endocrine: Negative.    Genitourinary: Negative.    Musculoskeletal: Positive for arthralgias, myalgias and neck pain.   Skin: Negative.    Allergic/Immunologic: Negative.    Neurological: Positive for headaches.   Hematological: Negative.    Psychiatric/Behavioral: Positive for dysphoric mood. Negative for suicidal ideas. The patient is nervous/anxious. The patient does not have insomnia.    All other systems reviewed and are negative.      Objective   Physical Exam   Constitutional: She is oriented to person, place, and time. She appears well-developed and well-nourished.   HENT:   Head: Normocephalic and atraumatic.   Right Ear: External ear normal.   Left Ear: External ear normal.   Nose: Nose normal.   Mouth/Throat: Oropharynx is clear and moist.   Eyes: Conjunctivae and EOM are normal. Pupils are equal, round, and reactive to light.   Neck: Normal range of motion. Neck supple.   Cardiovascular: Normal rate,  regular rhythm, normal heart sounds and intact distal pulses.  Exam reveals no gallop and no friction rub.    No murmur heard.  Pulmonary/Chest: Effort normal and breath sounds normal. She has no wheezes. She has no rales.   Abdominal: Soft. Bowel sounds are normal. She exhibits no mass. There is no tenderness. There is no rebound and no guarding.   Musculoskeletal:        Right hip: She exhibits decreased range of motion and laceration.        Left hip: She exhibits laceration.        Right knee: Tenderness found.        Left knee: Tenderness found.        Lumbar back: She exhibits decreased range of motion, tenderness and laceration.   Neurological: She is alert and oriented to person, place, and time. She has normal reflexes. No cranial nerve deficit. She exhibits normal muscle tone.   Skin: Skin is warm and dry. No rash noted.   Psychiatric: Her behavior is normal. Judgment and thought content normal. She exhibits a depressed mood.   Nursing note and vitals reviewed.      Assessment/Plan   Blaire was seen today for depression.    Diagnoses and all orders for this visit:    Moderate episode of recurrent major depressive disorder    Anxiety    Other orders  -     ARIPiprazole (ABILIFY) 30 MG tablet; Take 1 tablet by mouth Daily.

## 2018-05-22 RX ORDER — HYDROCODONE BITARTRATE AND ACETAMINOPHEN 7.5; 325 MG/1; MG/1
1 TABLET ORAL EVERY 6 HOURS PRN
Qty: 120 TABLET | Refills: 0 | Status: SHIPPED | OUTPATIENT
Start: 2018-05-22 | End: 2018-06-22 | Stop reason: SDUPTHER

## 2018-05-22 RX ORDER — BUSPIRONE HYDROCHLORIDE 5 MG/1
5 TABLET ORAL 3 TIMES DAILY
Qty: 21 TABLET | Refills: 0 | Status: SHIPPED | OUTPATIENT
Start: 2018-05-22 | End: 2018-06-01 | Stop reason: DRUGHIGH

## 2018-05-22 RX ORDER — PRAVASTATIN SODIUM 80 MG/1
80 TABLET ORAL NIGHTLY
Qty: 14 TABLET | Refills: 0 | Status: SHIPPED | OUTPATIENT
Start: 2018-05-22 | End: 2018-06-11 | Stop reason: SDUPTHER

## 2018-05-30 RX ORDER — ATENOLOL 50 MG/1
50 TABLET ORAL DAILY
Qty: 30 TABLET | Refills: 5 | Status: SHIPPED | OUTPATIENT
Start: 2018-05-30 | End: 2019-08-29

## 2018-05-30 RX ORDER — MELOXICAM 15 MG/1
15 TABLET ORAL DAILY
Qty: 30 TABLET | Refills: 5 | Status: SHIPPED | OUTPATIENT
Start: 2018-05-30 | End: 2018-07-13 | Stop reason: ALTCHOICE

## 2018-06-01 ENCOUNTER — OFFICE VISIT (OUTPATIENT)
Dept: FAMILY MEDICINE CLINIC | Facility: CLINIC | Age: 53
End: 2018-06-01

## 2018-06-01 VITALS
HEIGHT: 66 IN | WEIGHT: 180.4 LBS | TEMPERATURE: 98.2 F | SYSTOLIC BLOOD PRESSURE: 132 MMHG | OXYGEN SATURATION: 93 % | DIASTOLIC BLOOD PRESSURE: 76 MMHG | BODY MASS INDEX: 28.99 KG/M2 | HEART RATE: 90 BPM

## 2018-06-01 DIAGNOSIS — F41.9 ANXIETY: ICD-10-CM

## 2018-06-01 DIAGNOSIS — F33.1 MODERATE EPISODE OF RECURRENT MAJOR DEPRESSIVE DISORDER (HCC): Primary | ICD-10-CM

## 2018-06-01 PROCEDURE — 99213 OFFICE O/P EST LOW 20 MIN: CPT | Performed by: FAMILY MEDICINE

## 2018-06-01 RX ORDER — HYDROXYZINE PAMOATE 50 MG/1
50 CAPSULE ORAL 3 TIMES DAILY PRN
Qty: 60 CAPSULE | Refills: 5 | Status: SHIPPED | OUTPATIENT
Start: 2018-06-01 | End: 2019-08-29

## 2018-06-01 RX ORDER — BUSPIRONE HYDROCHLORIDE 10 MG/1
10 TABLET ORAL 3 TIMES DAILY
Qty: 90 TABLET | Refills: 6 | Status: SHIPPED | OUTPATIENT
Start: 2018-06-01 | End: 2018-07-13 | Stop reason: ALTCHOICE

## 2018-06-01 RX ORDER — MIRTAZAPINE 15 MG/1
15 TABLET, FILM COATED ORAL NIGHTLY
Qty: 30 TABLET | Refills: 6 | Status: SHIPPED | OUTPATIENT
Start: 2018-06-01 | End: 2018-07-13 | Stop reason: DRUGHIGH

## 2018-06-01 NOTE — PROGRESS NOTES
Subjective   Blaire Sullivan is a 52 y.o. female.   Chief Complaint   Patient presents with   • Anxiety     PT states it has gotten bad in the last couple months, having panic attacks, doesnt feel like medicine is working   • Depression     PT stats aniexty is worse       Anxiety   Symptoms include excessive worry, nervous/anxious behavior and panic. Patient reports no suicidal ideas.     Her past medical history is significant for depression.   Depression   Visit Type: follow-up  Patient presents with the following symptoms: excessive worry, fatigue, nervousness/anxiety and panic.  Patient is not experiencing: anhedonia, suicidal ideas, suicidal planning and thoughts of death.  Frequency of symptoms: most days   Severity: interfering with daily activities   Sleep quality: non-restorative  Compliance with medications:  %             The following portions of the patient's history were reviewed and updated as appropriate: allergies, current medications, past family history, past medical history, past social history, past surgical history and problem list.    Review of Systems   Constitutional: Positive for fatigue.   Eyes: Negative.    Respiratory: Negative.    Cardiovascular: Negative.    Gastrointestinal: Negative.    Endocrine: Negative.    Genitourinary: Negative.    Musculoskeletal: Positive for arthralgias, myalgias and neck pain.   Skin: Negative.    Allergic/Immunologic: Negative.    Neurological: Positive for headaches.   Hematological: Negative.    Psychiatric/Behavioral: Positive for dysphoric mood. Negative for suicidal ideas. The patient is nervous/anxious.    All other systems reviewed and are negative.      Objective   Physical Exam   Constitutional: She is oriented to person, place, and time. She appears well-developed and well-nourished.   HENT:   Head: Normocephalic and atraumatic.   Right Ear: External ear normal.   Left Ear: External ear normal.   Nose: Nose normal.   Mouth/Throat:  Oropharynx is clear and moist.   Eyes: Conjunctivae and EOM are normal. Pupils are equal, round, and reactive to light.   Neck: Normal range of motion. Neck supple.   Cardiovascular: Normal rate, regular rhythm, normal heart sounds and intact distal pulses.  Exam reveals no gallop and no friction rub.    No murmur heard.  Pulmonary/Chest: Effort normal and breath sounds normal. She has no wheezes. She has no rales.   Abdominal: Soft. Bowel sounds are normal. She exhibits no mass. There is no tenderness. There is no rebound and no guarding.   Musculoskeletal:        Right hip: She exhibits decreased range of motion and laceration.        Left hip: She exhibits laceration.        Right knee: Tenderness found.        Left knee: Tenderness found.        Lumbar back: She exhibits decreased range of motion, tenderness and laceration.   Neurological: She is alert and oriented to person, place, and time. She has normal reflexes. No cranial nerve deficit. She exhibits normal muscle tone.   Skin: Skin is warm and dry. No rash noted.   Psychiatric: Her behavior is normal. Judgment and thought content normal. She exhibits a depressed mood.   Nursing note and vitals reviewed.      Assessment/Plan   Blaire was seen today for anxiety and depression.    Diagnoses and all orders for this visit:    Moderate episode of recurrent major depressive disorder    Anxiety    Other orders  -     busPIRone (BUSPAR) 10 MG tablet; Take 1 tablet by mouth 3 (Three) Times a Day.  -     mirtazapine (REMERON) 15 MG tablet; Take 1 tablet by mouth Every Night.  -     hydrOXYzine (VISTARIL) 50 MG capsule; Take 1 capsule by mouth 3 (Three) Times a Day As Needed for Anxiety.

## 2018-06-11 RX ORDER — PRAVASTATIN SODIUM 80 MG/1
80 TABLET ORAL NIGHTLY
Qty: 14 TABLET | Refills: 0 | Status: SHIPPED | OUTPATIENT
Start: 2018-06-11 | End: 2018-08-24 | Stop reason: SDUPTHER

## 2018-06-22 DIAGNOSIS — M48.061 SPINAL STENOSIS OF LUMBAR REGION, UNSPECIFIED WHETHER NEUROGENIC CLAUDICATION PRESENT: Primary | ICD-10-CM

## 2018-06-22 RX ORDER — HYDROCODONE BITARTRATE AND ACETAMINOPHEN 7.5; 325 MG/1; MG/1
1 TABLET ORAL EVERY 6 HOURS PRN
Qty: 120 TABLET | Refills: 0 | Status: SHIPPED | OUTPATIENT
Start: 2018-06-22 | End: 2018-07-13 | Stop reason: SDUPTHER

## 2018-06-25 ENCOUNTER — OFFICE VISIT (OUTPATIENT)
Dept: FAMILY MEDICINE CLINIC | Facility: CLINIC | Age: 53
End: 2018-06-25

## 2018-06-25 VITALS
BODY MASS INDEX: 28.12 KG/M2 | HEART RATE: 75 BPM | OXYGEN SATURATION: 94 % | DIASTOLIC BLOOD PRESSURE: 90 MMHG | WEIGHT: 175 LBS | SYSTOLIC BLOOD PRESSURE: 140 MMHG | TEMPERATURE: 98.3 F | HEIGHT: 66 IN

## 2018-06-25 DIAGNOSIS — F33.1 MODERATE EPISODE OF RECURRENT MAJOR DEPRESSIVE DISORDER (HCC): Primary | ICD-10-CM

## 2018-06-25 DIAGNOSIS — F51.01 PRIMARY INSOMNIA: ICD-10-CM

## 2018-06-25 DIAGNOSIS — F41.9 ANXIETY: ICD-10-CM

## 2018-06-25 PROCEDURE — 99213 OFFICE O/P EST LOW 20 MIN: CPT | Performed by: FAMILY MEDICINE

## 2018-06-25 RX ORDER — ZIPRASIDONE HYDROCHLORIDE 40 MG/1
40 CAPSULE ORAL 2 TIMES DAILY WITH MEALS
Qty: 60 CAPSULE | Refills: 2 | Status: SHIPPED | OUTPATIENT
Start: 2018-06-25 | End: 2018-07-13 | Stop reason: ALTCHOICE

## 2018-06-25 NOTE — PROGRESS NOTES
Subjective   Blaire Sullivan is a 52 y.o. female.   Chief Complaint   Patient presents with   • depressive disorder     2 mth fu        Depression   Visit Type: follow-up  Patient presents with the following symptoms: depressed mood, excessive worry, fatigue, nervousness/anxiety, panic and weight loss.  Patient is not experiencing: suicidal ideas, suicidal planning and thoughts of death.  Frequency of symptoms: most days   Severity: causing significant distress   Sleep quality: poor  Compliance with medications:  %  Treatment side effects: none.       The following portions of the patient's history were reviewed and updated as appropriate: allergies, current medications, past family history, past medical history, past social history, past surgical history and problem list.    Review of Systems   Constitutional: Positive for fatigue and weight loss.   HENT: Negative.    Eyes: Negative.    Respiratory: Negative.    Cardiovascular: Negative.    Gastrointestinal: Negative.    Endocrine: Negative.    Genitourinary: Negative.    Musculoskeletal: Positive for arthralgias and back pain.   Skin: Negative.    Allergic/Immunologic: Negative.    Neurological: Negative.    Hematological: Negative.    Psychiatric/Behavioral: Positive for dysphoric mood and sleep disturbance. Negative for suicidal ideas. The patient is nervous/anxious.    All other systems reviewed and are negative.      Objective   Physical Exam   Constitutional: She is oriented to person, place, and time. She appears well-developed and well-nourished.   HENT:   Head: Normocephalic and atraumatic.   Right Ear: External ear normal.   Left Ear: External ear normal.   Nose: Nose normal.   Mouth/Throat: Oropharynx is clear and moist.   Eyes: Conjunctivae and EOM are normal. Pupils are equal, round, and reactive to light.   Neck: Normal range of motion. Neck supple.   Cardiovascular: Normal rate, regular rhythm, normal heart sounds and intact distal pulses.   Exam reveals no gallop and no friction rub.    No murmur heard.  Pulmonary/Chest: Effort normal and breath sounds normal. She has no wheezes. She has no rales.   Abdominal: Soft. Bowel sounds are normal. She exhibits no mass. There is no tenderness. There is no rebound and no guarding.   Musculoskeletal:        Right hip: She exhibits decreased range of motion and laceration.        Left hip: She exhibits laceration.        Right knee: Tenderness found.        Left knee: Tenderness found.        Lumbar back: She exhibits decreased range of motion, tenderness and laceration.   Neurological: She is alert and oriented to person, place, and time. She has normal reflexes. No cranial nerve deficit. She exhibits normal muscle tone.   Skin: Skin is warm and dry. No rash noted.   Psychiatric: Her behavior is normal. Judgment and thought content normal. She exhibits a depressed mood.   Nursing note and vitals reviewed.      Assessment/Plan   Blaire was seen today for depressive disorder.    Diagnoses and all orders for this visit:    Moderate episode of recurrent major depressive disorder    Anxiety    Primary insomnia    Other orders  -     ziprasidone (GEODON) 40 MG capsule; Take 1 capsule by mouth 2 (Two) Times a Day With Meals.        Stop abilify

## 2018-07-13 ENCOUNTER — OFFICE VISIT (OUTPATIENT)
Dept: FAMILY MEDICINE CLINIC | Facility: CLINIC | Age: 53
End: 2018-07-13

## 2018-07-13 VITALS
WEIGHT: 171 LBS | HEIGHT: 66 IN | BODY MASS INDEX: 27.48 KG/M2 | OXYGEN SATURATION: 97 % | DIASTOLIC BLOOD PRESSURE: 90 MMHG | SYSTOLIC BLOOD PRESSURE: 142 MMHG | TEMPERATURE: 99.8 F | HEART RATE: 110 BPM

## 2018-07-13 DIAGNOSIS — F33.1 MODERATE EPISODE OF RECURRENT MAJOR DEPRESSIVE DISORDER (HCC): ICD-10-CM

## 2018-07-13 DIAGNOSIS — F41.9 ANXIETY: ICD-10-CM

## 2018-07-13 DIAGNOSIS — M17.0 OSTEOARTHRITIS OF BOTH KNEES, UNSPECIFIED OSTEOARTHRITIS TYPE: Primary | ICD-10-CM

## 2018-07-13 DIAGNOSIS — M48.061 SPINAL STENOSIS OF LUMBAR REGION, UNSPECIFIED WHETHER NEUROGENIC CLAUDICATION PRESENT: ICD-10-CM

## 2018-07-13 PROCEDURE — 99214 OFFICE O/P EST MOD 30 MIN: CPT | Performed by: FAMILY MEDICINE

## 2018-07-13 RX ORDER — HYDROCODONE BITARTRATE AND ACETAMINOPHEN 7.5; 325 MG/1; MG/1
1 TABLET ORAL EVERY 6 HOURS PRN
Qty: 120 TABLET | Refills: 0 | Status: SHIPPED | OUTPATIENT
Start: 2018-07-13 | End: 2018-07-13 | Stop reason: SDUPTHER

## 2018-07-13 RX ORDER — LITHIUM CARBONATE 300 MG
300 TABLET ORAL DAILY
COMMUNITY
Start: 2018-07-11 | End: 2019-08-29

## 2018-07-13 RX ORDER — HYDROCODONE BITARTRATE AND ACETAMINOPHEN 7.5; 325 MG/1; MG/1
1 TABLET ORAL EVERY 6 HOURS PRN
Qty: 120 TABLET | Refills: 0 | Status: SHIPPED | OUTPATIENT
Start: 2018-07-13 | End: 2019-08-29

## 2018-07-13 RX ORDER — MULTIVIT-MIN/IRON/FOLIC ACID/K 18-600-40
1000 CAPSULE ORAL DAILY
COMMUNITY
Start: 2018-07-12 | End: 2019-08-29

## 2018-07-13 RX ORDER — MIRTAZAPINE 30 MG/1
30 TABLET, FILM COATED ORAL NIGHTLY
COMMUNITY
Start: 2018-07-11 | End: 2019-08-29

## 2018-07-13 NOTE — PROGRESS NOTES
Subjective   Blaire Sullivan is a 52 y.o. female.   Chief Complaint   Patient presents with   • Depression     controlled refill        History of Present Illness   Patient with history of depression and anxiety as well as arthritis is in today for reevaluation.  She is requesting refill of pain medication for chronic arthritis.  Patient's depression has worsened to the point which she was suicidal.  I recommended to her  that she be taken directly to the hospital for psychiatric evaluation.  She was evaluated inpatient or 3 days and discharged with Remeron and lithium.  She is seeing psychiatry and counseling at this point.  Additional history has been provided by .      The following portions of the patient's history were reviewed and updated as appropriate: allergies, current medications, past family history, past medical history, past social history, past surgical history and problem list.    Review of Systems   Constitutional: Positive for fatigue.   HENT: Negative.    Eyes: Negative.    Respiratory: Negative.    Cardiovascular: Negative.    Gastrointestinal: Negative.    Endocrine: Negative.    Genitourinary: Negative.    Musculoskeletal: Positive for arthralgias and back pain.   Skin: Negative.    Allergic/Immunologic: Negative.    Neurological: Negative.    Hematological: Negative.    Psychiatric/Behavioral: Positive for dysphoric mood and sleep disturbance. Negative for suicidal ideas. The patient is nervous/anxious.    All other systems reviewed and are negative.      Objective   Physical Exam   Constitutional: She is oriented to person, place, and time. She appears well-developed and well-nourished.   HENT:   Head: Normocephalic and atraumatic.   Right Ear: External ear normal.   Left Ear: External ear normal.   Nose: Nose normal.   Mouth/Throat: Oropharynx is clear and moist.   Eyes: Conjunctivae and EOM are normal. Pupils are equal, round, and reactive to light.   Neck: Normal range of  motion. Neck supple.   Cardiovascular: Normal rate, regular rhythm, normal heart sounds and intact distal pulses.  Exam reveals no gallop and no friction rub.    No murmur heard.  Pulmonary/Chest: Effort normal and breath sounds normal. She has no wheezes. She has no rales.   Abdominal: Soft. Bowel sounds are normal. She exhibits no mass. There is no tenderness. There is no rebound and no guarding.   Musculoskeletal:        Right hip: She exhibits decreased range of motion and laceration.        Left hip: She exhibits laceration.        Right knee: Tenderness found.        Left knee: Tenderness found.        Lumbar back: She exhibits decreased range of motion, tenderness and laceration.   Neurological: She is alert and oriented to person, place, and time. She has normal reflexes. No cranial nerve deficit. She exhibits normal muscle tone.   Skin: Skin is warm and dry. No rash noted.   Psychiatric: Her behavior is normal. Judgment and thought content normal. Her mood appears anxious.   Nursing note and vitals reviewed.  renay reviewed    Assessment/Plan   Blaire was seen today for depression.    Diagnoses and all orders for this visit:    Osteoarthritis of both knees, unspecified osteoarthritis type    Moderate episode of recurrent major depressive disorder (CMS/HCC)    Anxiety    Spinal stenosis of lumbar region, unspecified whether neurogenic claudication present  -     Discontinue: HYDROcodone-acetaminophen (NORCO) 7.5-325 MG per tablet; Take 1 tablet by mouth Every 6 (Six) Hours As Needed for Moderate Pain .  -     Discontinue: HYDROcodone-acetaminophen (NORCO) 7.5-325 MG per tablet; Take 1 tablet by mouth Every 6 (Six) Hours As Needed for Moderate Pain .  -     Discontinue: HYDROcodone-acetaminophen (NORCO) 7.5-325 MG per tablet; Take 1 tablet by mouth Every 6 (Six) Hours As Needed for Moderate Pain .  -     HYDROcodone-acetaminophen (NORCO) 7.5-325 MG per tablet; Take 1 tablet by mouth Every 6 (Six) Hours As  Needed for Moderate Pain .      Obtain hospital records.  EKG has been reviewed.

## 2018-08-24 RX ORDER — PRAVASTATIN SODIUM 80 MG/1
80 TABLET ORAL NIGHTLY
Qty: 90 TABLET | Refills: 1 | Status: SHIPPED | OUTPATIENT
Start: 2018-08-24 | End: 2019-08-29 | Stop reason: SDUPTHER

## 2019-08-29 ENCOUNTER — OFFICE VISIT (OUTPATIENT)
Dept: FAMILY MEDICINE CLINIC | Facility: CLINIC | Age: 54
End: 2019-08-29

## 2019-08-29 VITALS
TEMPERATURE: 98.2 F | RESPIRATION RATE: 16 BRPM | BODY MASS INDEX: 30.01 KG/M2 | SYSTOLIC BLOOD PRESSURE: 124 MMHG | DIASTOLIC BLOOD PRESSURE: 72 MMHG | HEART RATE: 88 BPM | HEIGHT: 66 IN | WEIGHT: 186.7 LBS | OXYGEN SATURATION: 97 %

## 2019-08-29 DIAGNOSIS — F51.01 PRIMARY INSOMNIA: ICD-10-CM

## 2019-08-29 DIAGNOSIS — R73.9 HYPERGLYCEMIA: ICD-10-CM

## 2019-08-29 DIAGNOSIS — F41.9 ANXIETY: ICD-10-CM

## 2019-08-29 DIAGNOSIS — Z12.39 SCREENING FOR BREAST CANCER: ICD-10-CM

## 2019-08-29 DIAGNOSIS — G43.709 CHRONIC MIGRAINE WITHOUT AURA WITHOUT STATUS MIGRAINOSUS, NOT INTRACTABLE: ICD-10-CM

## 2019-08-29 DIAGNOSIS — E78.5 HYPERLIPIDEMIA, UNSPECIFIED HYPERLIPIDEMIA TYPE: ICD-10-CM

## 2019-08-29 DIAGNOSIS — Z76.89 ESTABLISHING CARE WITH NEW DOCTOR, ENCOUNTER FOR: ICD-10-CM

## 2019-08-29 DIAGNOSIS — E55.9 VITAMIN D INSUFFICIENCY: ICD-10-CM

## 2019-08-29 DIAGNOSIS — Z00.00 ANNUAL PHYSICAL EXAM: Primary | ICD-10-CM

## 2019-08-29 DIAGNOSIS — F33.1 MODERATE EPISODE OF RECURRENT MAJOR DEPRESSIVE DISORDER (HCC): ICD-10-CM

## 2019-08-29 PROCEDURE — 99396 PREV VISIT EST AGE 40-64: CPT | Performed by: NURSE PRACTITIONER

## 2019-08-29 RX ORDER — ARIPIPRAZOLE 5 MG/1
TABLET ORAL
Refills: 1 | COMMUNITY
Start: 2019-08-19 | End: 2021-05-13

## 2019-08-29 RX ORDER — PRAZOSIN HYDROCHLORIDE 2 MG/1
2 CAPSULE ORAL
Refills: 1 | COMMUNITY
Start: 2019-08-19 | End: 2021-09-20

## 2019-08-29 RX ORDER — RIZATRIPTAN BENZOATE 10 MG/1
10 TABLET ORAL AS NEEDED
Qty: 9 TABLET | Refills: 5 | Status: SHIPPED | OUTPATIENT
Start: 2019-08-29 | End: 2019-12-31

## 2019-08-29 RX ORDER — BUPROPION HYDROCHLORIDE 300 MG/1
200 TABLET ORAL 2 TIMES DAILY
Refills: 2 | COMMUNITY
Start: 2019-08-20 | End: 2022-04-05

## 2019-08-29 RX ORDER — TRAZODONE HYDROCHLORIDE 100 MG/1
TABLET ORAL
Refills: 2 | COMMUNITY
Start: 2019-08-19 | End: 2019-10-14

## 2019-08-29 RX ORDER — FLUOXETINE HYDROCHLORIDE 20 MG/1
CAPSULE ORAL
Refills: 2 | COMMUNITY
Start: 2019-08-19 | End: 2020-11-13

## 2019-08-29 RX ORDER — PRAVASTATIN SODIUM 80 MG/1
80 TABLET ORAL NIGHTLY
Qty: 90 TABLET | Refills: 1 | Status: SHIPPED | OUTPATIENT
Start: 2019-08-29 | End: 2020-01-16

## 2019-08-29 NOTE — PROGRESS NOTES
Chief Complaint   Patient presents with   • Establish Care     Subjective   Blaire Sullivan is a 53 y.o. female who presents to the office to [facility]. Annual physical exam due today.  The following portions of the patient's history were reviewed and updated as appropriate: allergies, current medications, past family history, past medical history, past social history, past surgical history and problem list.    History of Present Illness     Overdue for labs.    Mammogram: Last in 4/2018 and normal at that time.  Pelvic exam in 5/2018, Dr Mcintyre, GYN, history of partial hysterectomy. Due again in 2021.  Colonoscopy: 2013    Anxiety and Depression: well-controlled with Abilify, Wellbutrin, and Prozac. Has been taking these since January and notes significant improvement in symptoms. Denies suicidal thoughts.    Migraines: Onset during teenage years, occur at least several times monthly, sometimes monthly. On Maxalt which is helpful and interrupts headaches.     Insomnia: Reports chronic problem.  States at least once per week she has trouble falling asleep. States that this is usually due to anxiety and inability to shut her mind off. Usually able to fall back to sleep if awakens during the night. Obtains 9-10hours of sleep nightly and wakes up feeling rested. Takes trazodone 100mg nightly. Has never had a sleep study and is not interested at this time.      Vitamin D Deficiency: 24.9 in July 2018, has never been treated.    Past Medical History:   Diagnosis Date   • Abdominal pain 07/22/2013   • Acute bronchitis 05/21/2015   • Acute maxillary sinusitis 10/04/2012   • Allergic 2001   • Allergic rhinitis 02/20/2015   • Depression 02/17/2016    Depressive disorder      • Drug therapy 02/24/2014    Long-term   • Drug therapy 05/11/2016    Other long term (current)    • Dyslipidemia 08/29/2013   • Encounters for administrative purpose 07/14/2015   • Generalized anxiety disorder 05/11/2016   • Hyperlipidemia  "04/14/2015   • Insomnia 05/11/2016   • Irritable bowel syndrome 2013   • Kidney stone 2001   • Low back pain 06/18/2015   • Migraine 04/14/2015   • Osteoarthritis 05/11/2016   • Pneumonia 1978   • Urinary tract infectious disease 06/22/2016          Family History   Problem Relation Age of Onset   • Coronary artery disease Other    • Peripheral vascular disease Mother    • Hypertension Mother    • Arthritis Mother    • Heart failure Mother    • Hyperlipidemia Mother    • Cancer Mother         Cervical cancer   • Depression Mother         Onset around age 80   • Heart disease Mother    • Arthritis Father    • Alcohol abuse Father         Committed suicide   • Depression Father    • No Known Problems Sister         Review of Systems   Constitutional: Negative.  Negative for fever and unexpected weight change.   Eyes: Negative.    Respiratory: Negative.  Negative for cough, chest tightness and shortness of breath.    Cardiovascular: Negative.  Negative for chest pain.   Gastrointestinal: Negative.  Negative for constipation, diarrhea and nausea.   Endocrine: Negative.    Genitourinary: Negative.  Negative for difficulty urinating, dysuria and urgency.   Musculoskeletal: Negative.    Skin: Negative.  Negative for color change, pallor, rash and wound.   Allergic/Immunologic: Negative.    Neurological: Positive for headaches (chronic migraines since age 13).   Hematological: Negative.    Psychiatric/Behavioral: Positive for dysphoric mood and sleep disturbance (controlled with Trazodone). Negative for suicidal ideas.       Objective   Vitals:    08/29/19 1420   BP: 124/72   BP Location: Left arm   Patient Position: Sitting   Cuff Size: Adult   Pulse: 88   Resp: 16   Temp: 98.2 °F (36.8 °C)   TempSrc: Tympanic   SpO2: 97%   Weight: 84.7 kg (186 lb 11.2 oz)   Height: 167.6 cm (66\")   PainSc: 0-No pain     Physical Exam   Constitutional: She is oriented to person, place, and time. She appears well-developed and " well-nourished.   HENT:   Head: Normocephalic and atraumatic.   Eyes: Conjunctivae are normal. Pupils are equal, round, and reactive to light.   Neck: Normal range of motion. Neck supple.   Cardiovascular: Normal rate, regular rhythm, normal heart sounds and intact distal pulses. Exam reveals no gallop and no friction rub.   No murmur heard.  Pulmonary/Chest: Effort normal and breath sounds normal. No respiratory distress. She has no wheezes. She has no rales. She exhibits no tenderness.   Abdominal: Soft. Bowel sounds are normal.   Musculoskeletal: Normal range of motion. She exhibits no edema, tenderness or deformity.   Lymphadenopathy:     She has no cervical adenopathy.   Neurological: She is alert and oriented to person, place, and time.   Skin: Skin is warm and dry. Capillary refill takes 2 to 3 seconds. No erythema. No pallor.   Psychiatric: She has a normal mood and affect. Her behavior is normal. Judgment and thought content normal.   Nursing note and vitals reviewed.      Assessment/Plan   Blaire was seen today for establish care.    Diagnoses and all orders for this visit:    Annual physical exam  Comments:  no abnormals found today.    Moderate episode of recurrent major depressive disorder (CMS/HCC)    Anxiety  -     T4, Free  -     TSH  -     T3    Primary insomnia    Chronic migraine without aura without status migrainosus, not intractable  -     rizatriptan (MAXALT) 10 MG tablet; Take 1 tablet by mouth As Needed for Migraine. May repeat in 2 hours if needed    Screening for breast cancer  -     Mammo Screening Bilateral With CAD    Establishing care with new doctor, encounter for  -     CBC & Differential  -     Comprehensive Metabolic Panel    Vitamin D insufficiency  -     Vitamin D 25 Hydroxy    Hyperlipidemia, unspecified hyperlipidemia type  -     pravastatin (PRAVACHOL) 80 MG tablet; Take 1 tablet by mouth Every Night.  -     Lipid Panel    Hyperglycemia  -     Hemoglobin A1c  -      Comprehensive Metabolic Panel           PHQ-2/PHQ-9 Depression Screening 8/29/2019   Little interest or pleasure in doing things 0   Feeling down, depressed, or hopeless 0   Trouble falling or staying asleep, or sleeping too much 0   Feeling tired or having little energy 0   Poor appetite or overeating 0   Feeling bad about yourself - or that you are a failure or have let yourself or your family down 0   Trouble concentrating on things, such as reading the newspaper or watching television 0   Moving or speaking so slowly that other people could have noticed. Or the opposite - being so fidgety or restless that you have been moving around a lot more than usual 0   Thoughts that you would be better off dead, or of hurting yourself in some way 0   Total Score 0   If you checked off any problems, how difficult have these problems made it for you to do your work, take care of things at home, or get along with other people? -       Jeannie Werner, APRN         Return in about 1 year (around 8/29/2020).    Patient Instructions   Have labs drawn in the next 7 days, will call with results. britt

## 2019-09-06 LAB
ALBUMIN SERPL-MCNC: 4.3 G/DL (ref 3.5–5)
ALBUMIN/GLOB SERPL: 1.5 G/DL (ref 1.1–1.8)
ALP SERPL-CCNC: 60 U/L (ref 38–126)
ALT SERPL W P-5'-P-CCNC: 21 U/L
ANION GAP SERPL CALCULATED.3IONS-SCNC: 9 MMOL/L (ref 5–15)
AST SERPL-CCNC: 20 U/L (ref 14–36)
BASOPHILS # BLD AUTO: 0.06 10*3/MM3 (ref 0–0.2)
BASOPHILS NFR BLD AUTO: 1 % (ref 0–1.5)
BILIRUB SERPL-MCNC: 0.3 MG/DL (ref 0.2–1.3)
BUN BLD-MCNC: 20 MG/DL (ref 7–23)
BUN/CREAT SERPL: 24.1 (ref 7–25)
CALCIUM SPEC-SCNC: 9.6 MG/DL (ref 8.4–10.2)
CHLORIDE SERPL-SCNC: 105 MMOL/L (ref 101–112)
CHOLEST SERPL-MCNC: 230 MG/DL (ref 150–200)
CO2 SERPL-SCNC: 27 MMOL/L (ref 22–30)
CREAT BLD-MCNC: 0.83 MG/DL (ref 0.52–1.04)
DEPRECATED RDW RBC AUTO: 43.5 FL (ref 37–54)
EOSINOPHIL # BLD AUTO: 0.13 10*3/MM3 (ref 0–0.4)
EOSINOPHIL NFR BLD AUTO: 2.2 % (ref 0.3–6.2)
ERYTHROCYTE [DISTWIDTH] IN BLOOD BY AUTOMATED COUNT: 13.7 % (ref 12.3–15.4)
GFR SERPL CREATININE-BSD FRML MDRD: 72 ML/MIN/1.73 (ref 51–120)
GLOBULIN UR ELPH-MCNC: 2.8 GM/DL (ref 2.3–3.5)
GLUCOSE BLD-MCNC: 94 MG/DL (ref 70–99)
HCT VFR BLD AUTO: 39.7 % (ref 34–46.6)
HDLC SERPL-MCNC: 74 MG/DL (ref 40–59)
HGB BLD-MCNC: 13.3 G/DL (ref 12–15.9)
LDLC SERPL CALC-MCNC: 131 MG/DL
LDLC/HDLC SERPL: 1.77 {RATIO} (ref 0–3.22)
LYMPHOCYTES # BLD AUTO: 1.96 10*3/MM3 (ref 0.7–3.1)
LYMPHOCYTES NFR BLD AUTO: 33.5 % (ref 19.6–45.3)
MCH RBC QN AUTO: 29.5 PG (ref 26.6–33)
MCHC RBC AUTO-ENTMCNC: 33.5 G/DL (ref 31.5–35.7)
MCV RBC AUTO: 88 FL (ref 79–97)
MONOCYTES # BLD AUTO: 0.47 10*3/MM3 (ref 0.1–0.9)
MONOCYTES NFR BLD AUTO: 8 % (ref 5–12)
NEUTROPHILS # BLD AUTO: 3.23 10*3/MM3 (ref 1.7–7)
NEUTROPHILS NFR BLD AUTO: 55.3 % (ref 42.7–76)
PLATELET # BLD AUTO: 285 10*3/MM3 (ref 140–450)
PMV BLD AUTO: 9.7 FL (ref 6–12)
POTASSIUM BLD-SCNC: 4.7 MMOL/L (ref 3.4–5)
PROT SERPL-MCNC: 7.1 G/DL (ref 6.3–8.6)
RBC # BLD AUTO: 4.51 10*6/MM3 (ref 3.77–5.28)
SODIUM BLD-SCNC: 141 MMOL/L (ref 137–145)
TRIGL SERPL-MCNC: 125 MG/DL
VLDLC SERPL-MCNC: 25 MG/DL
WBC NRBC COR # BLD: 5.85 10*3/MM3 (ref 3.4–10.8)

## 2019-09-06 PROCEDURE — 36415 COLL VENOUS BLD VENIPUNCTURE: CPT | Performed by: NURSE PRACTITIONER

## 2019-09-06 PROCEDURE — 85025 COMPLETE CBC W/AUTO DIFF WBC: CPT | Performed by: NURSE PRACTITIONER

## 2019-09-06 PROCEDURE — 82306 VITAMIN D 25 HYDROXY: CPT | Performed by: NURSE PRACTITIONER

## 2019-09-06 PROCEDURE — 83036 HEMOGLOBIN GLYCOSYLATED A1C: CPT | Performed by: NURSE PRACTITIONER

## 2019-09-06 PROCEDURE — 80053 COMPREHEN METABOLIC PANEL: CPT | Performed by: NURSE PRACTITIONER

## 2019-09-06 PROCEDURE — 84443 ASSAY THYROID STIM HORMONE: CPT | Performed by: NURSE PRACTITIONER

## 2019-09-06 PROCEDURE — 80061 LIPID PANEL: CPT | Performed by: NURSE PRACTITIONER

## 2019-09-06 PROCEDURE — 84480 ASSAY TRIIODOTHYRONINE (T3): CPT | Performed by: NURSE PRACTITIONER

## 2019-09-06 PROCEDURE — 84439 ASSAY OF FREE THYROXINE: CPT | Performed by: NURSE PRACTITIONER

## 2019-09-07 LAB
25(OH)D3 SERPL-MCNC: 31.1 NG/ML (ref 30–100)
HBA1C MFR BLD: 5.48 % (ref 4.8–5.6)
T3 SERPL-MCNC: 108 NG/DL (ref 80–200)
T4 FREE SERPL-MCNC: 1.06 NG/DL (ref 0.93–1.7)
TSH SERPL DL<=0.05 MIU/L-ACNC: 2.45 UIU/ML (ref 0.27–4.2)

## 2019-10-14 ENCOUNTER — OFFICE VISIT (OUTPATIENT)
Dept: FAMILY MEDICINE CLINIC | Facility: CLINIC | Age: 54
End: 2019-10-14

## 2019-10-14 VITALS
TEMPERATURE: 98.4 F | RESPIRATION RATE: 16 BRPM | SYSTOLIC BLOOD PRESSURE: 122 MMHG | WEIGHT: 190.9 LBS | HEART RATE: 71 BPM | HEIGHT: 69 IN | BODY MASS INDEX: 28.27 KG/M2 | DIASTOLIC BLOOD PRESSURE: 70 MMHG | OXYGEN SATURATION: 97 %

## 2019-10-14 DIAGNOSIS — Z23 IMMUNIZATION DUE: ICD-10-CM

## 2019-10-14 DIAGNOSIS — Z23 NEED FOR INFLUENZA VACCINATION: Primary | ICD-10-CM

## 2019-10-14 DIAGNOSIS — E78.2 MIXED HYPERLIPIDEMIA: ICD-10-CM

## 2019-10-14 PROCEDURE — 90471 IMMUNIZATION ADMIN: CPT | Performed by: NURSE PRACTITIONER

## 2019-10-14 PROCEDURE — 99212 OFFICE O/P EST SF 10 MIN: CPT | Performed by: NURSE PRACTITIONER

## 2019-10-14 PROCEDURE — 90674 CCIIV4 VAC NO PRSV 0.5 ML IM: CPT | Performed by: NURSE PRACTITIONER

## 2019-10-14 RX ORDER — ZOLPIDEM TARTRATE 10 MG/1
TABLET ORAL
Refills: 2 | COMMUNITY
Start: 2019-09-30

## 2019-10-14 NOTE — PROGRESS NOTES
Chief Complaint   Patient presents with   • Hyperlipidemia     lab f/u     Subjective   Blaire Sullivan is a 54 y.o. female who presents to the office for follow up of hyperlipidemia    The following portions of the patient's history were reviewed and updated as appropriate: allergies, current medications, past family history, past medical history, past social history, past surgical history and problem list.    History of Present Illness     Hyperlipidemia: total chol 230, HDL good at 74, and  on 9/6/19. On Pravastatin 80mg. Had been out of medication for a while when between providers until resumed about 10 days prior to labs. Will need to recheck in December or January, had been well controlled  On this dose in the past. Will continue on 80mg pravastatin until then and make changes if needed at that time.   Discussed proper dietary management of cholesterol as well.     Discussed other labs, all normal, from 9/6/19. She has been concerned with weight gain, hair thinning and was concerned It may have been thyroid related. T3, T4, TSH were completely normal. Advised to discuss with [facility] because it may be due to progesterone or other hormone levels.       Past Medical History:   Diagnosis Date   • Abdominal pain 07/22/2013   • Acute bronchitis 05/21/2015   • Acute maxillary sinusitis 10/04/2012   • Allergic 2001   • Allergic rhinitis 02/20/2015   • Depression 02/17/2016    Depressive disorder      • Drug therapy 02/24/2014    Long-term   • Drug therapy 05/11/2016    Other long term (current)    • Dyslipidemia 08/29/2013   • Encounters for administrative purpose 07/14/2015   • Generalized anxiety disorder 05/11/2016   • Hyperlipidemia 04/14/2015   • Insomnia 05/11/2016   • Irritable bowel syndrome 2013   • Kidney stone 2001   • Low back pain 06/18/2015   • Migraine 04/14/2015   • Osteoarthritis 05/11/2016   • Pneumonia 1978   • Urinary tract infectious disease  "06/22/2016          Family History   Problem Relation Age of Onset   • Coronary artery disease Other    • Peripheral vascular disease Mother    • Hypertension Mother    • Arthritis Mother    • Heart failure Mother    • Hyperlipidemia Mother    • Cancer Mother         Cervical cancer   • Depression Mother         Onset around age 80   • Heart disease Mother    • Arthritis Father    • Alcohol abuse Father         Committed suicide   • Depression Father    • No Known Problems Sister         Review of Systems   Constitutional: Negative.  Negative for fever and unexpected weight change.   Eyes: Negative.    Respiratory: Negative.  Negative for cough, chest tightness and shortness of breath.    Cardiovascular: Negative.  Negative for chest pain.   Gastrointestinal: Negative.  Negative for constipation, diarrhea and nausea.   Endocrine: Negative.    Genitourinary: Negative.  Negative for difficulty urinating, dysuria and urgency.   Musculoskeletal: Negative.    Skin: Negative.  Negative for color change, pallor, rash and wound.   Allergic/Immunologic: Negative.    Neurological: Positive for headaches (chronic migraines since age 13).   Hematological: Negative.    Psychiatric/Behavioral: Positive for dysphoric mood and sleep disturbance (controlled with Trazodone). Negative for suicidal ideas.   All other systems reviewed and are negative.      Objective   Vitals:    10/14/19 1525   BP: 122/70   BP Location: Left arm   Patient Position: Sitting   Cuff Size: Adult   Pulse: 71   Resp: 16   Temp: 98.4 °F (36.9 °C)   TempSrc: Tympanic   SpO2: 97%   Weight: 86.6 kg (190 lb 14.4 oz)   Height: 175.3 cm (69\")   PainSc: 0-No pain     Physical Exam   Constitutional: She is oriented to person, place, and time. She appears well-developed and well-nourished.   HENT:   Head: Normocephalic and atraumatic.   Eyes: Conjunctivae are normal. Pupils are equal, round, and reactive to light.   Neck: Normal range of motion. Neck supple. "   Cardiovascular: Normal rate, regular rhythm, normal heart sounds and intact distal pulses. Exam reveals no gallop and no friction rub.   No murmur heard.  Pulmonary/Chest: Effort normal and breath sounds normal. No respiratory distress. She has no wheezes. She has no rales. She exhibits no tenderness.   Abdominal: Soft. Bowel sounds are normal.   Musculoskeletal: Normal range of motion. She exhibits no edema, tenderness or deformity.   Lymphadenopathy:     She has no cervical adenopathy.   Neurological: She is alert and oriented to person, place, and time.   Skin: Skin is warm and dry. Capillary refill takes 2 to 3 seconds. No erythema. No pallor.   Psychiatric: She has a normal mood and affect. Her behavior is normal. Judgment and thought content normal.   Nursing note and vitals reviewed.      Assessment/Plan   Blaire was seen today for hyperlipidemia.    Diagnoses and all orders for this visit:    Need for influenza vaccination  -     Flucelvax Quad (Vial) =>4 yrs (7125-4309)  -     Influenza Vac Subunit Quad (FLUCELVAX) injection 0.5 mL    Immunization due    Mixed hyperlipidemia  -     Lipid Panel; Future           PHQ-2/PHQ-9 Depression Screening 10/14/2019   Little interest or pleasure in doing things 0   Feeling down, depressed, or hopeless 0   Trouble falling or staying asleep, or sleeping too much 0   Feeling tired or having little energy 0   Poor appetite or overeating 0   Feeling bad about yourself - or that you are a failure or have let yourself or your family down 0   Trouble concentrating on things, such as reading the newspaper or watching television 0   Moving or speaking so slowly that other people could have noticed. Or the opposite - being so fidgety or restless that you have been moving around a lot more than usual 0   Thoughts that you would be better off dead, or of hurting yourself in some way 0   Total Score 0   If you checked off any problems, how difficult have these problems made it for  you to do your work, take care of things at home, or get along with other people? -       Jeannie Werner, APRN         Return in about 3 months (around 1/14/2020).    There are no Patient Instructions on file for this visit.

## 2019-12-30 DIAGNOSIS — G43.709 CHRONIC MIGRAINE WITHOUT AURA WITHOUT STATUS MIGRAINOSUS, NOT INTRACTABLE: ICD-10-CM

## 2019-12-31 RX ORDER — RIZATRIPTAN BENZOATE 10 MG/1
TABLET ORAL
Qty: 9 TABLET | Refills: 5 | Status: SHIPPED | OUTPATIENT
Start: 2019-12-31 | End: 2020-03-06

## 2020-01-15 ENCOUNTER — LAB (OUTPATIENT)
Dept: LAB | Facility: OTHER | Age: 55
End: 2020-01-15

## 2020-01-15 DIAGNOSIS — E78.2 MIXED HYPERLIPIDEMIA: ICD-10-CM

## 2020-01-15 LAB
CHOLEST SERPL-MCNC: 265 MG/DL (ref 150–200)
HDLC SERPL-MCNC: 66 MG/DL (ref 40–59)
LDLC SERPL CALC-MCNC: 159 MG/DL
LDLC/HDLC SERPL: 2.41 {RATIO} (ref 0–3.22)
TRIGL SERPL-MCNC: 201 MG/DL
VLDLC SERPL-MCNC: 40.2 MG/DL

## 2020-01-15 PROCEDURE — 36415 COLL VENOUS BLD VENIPUNCTURE: CPT | Performed by: NURSE PRACTITIONER

## 2020-01-15 PROCEDURE — 80061 LIPID PANEL: CPT | Performed by: NURSE PRACTITIONER

## 2020-01-16 ENCOUNTER — OFFICE VISIT (OUTPATIENT)
Dept: FAMILY MEDICINE CLINIC | Facility: CLINIC | Age: 55
End: 2020-01-16

## 2020-01-16 VITALS
SYSTOLIC BLOOD PRESSURE: 120 MMHG | DIASTOLIC BLOOD PRESSURE: 76 MMHG | BODY MASS INDEX: 29.56 KG/M2 | HEART RATE: 109 BPM | RESPIRATION RATE: 16 BRPM | TEMPERATURE: 98 F | WEIGHT: 199.6 LBS | OXYGEN SATURATION: 97 % | HEIGHT: 69 IN

## 2020-01-16 DIAGNOSIS — E78.2 MIXED HYPERLIPIDEMIA: ICD-10-CM

## 2020-01-16 DIAGNOSIS — E55.9 VITAMIN D DEFICIENCY: Primary | ICD-10-CM

## 2020-01-16 PROCEDURE — 99213 OFFICE O/P EST LOW 20 MIN: CPT | Performed by: NURSE PRACTITIONER

## 2020-01-16 RX ORDER — ATORVASTATIN CALCIUM 80 MG/1
80 TABLET, FILM COATED ORAL DAILY
Qty: 90 TABLET | Refills: 1 | Status: SHIPPED | OUTPATIENT
Start: 2020-01-16 | End: 2020-05-01 | Stop reason: SDUPTHER

## 2020-01-16 NOTE — PATIENT INSTRUCTIONS
Stop simvastatin, start atorvastatin 80mg daily for cholesterol  Start vitamin D 3 replacement weekly    Follow up in 3 months with labs done 1-2 days ahead.

## 2020-01-16 NOTE — PROGRESS NOTES
Chief Complaint   Patient presents with   • Hyperlipidemia     12 week f/u     Subjective   Blaire Antonio Sullivan is a 54 y.o. female who presents to the office for routine 3 month follow up of hyperlipidemia, also needs vitamin D treatment.    The following portions of the patient's history were reviewed and updated as appropriate: allergies, current medications, past family history, past medical history, past social history, past surgical history and problem list.    History of Present Illness   Vitamin D deficiency: needs replacement    Hyperlipidemia, not controlled with max dose simvastatin, agreeable to change to atorvastatin. May be an adverse effect of Abilify. Will have to look at other meds for anxiety/ depression, if atorvastatin not helpful.     HPI, ROS  and PE from most recent  Visit carried forward and updated as appropriate for current situation.    Past Medical History:   Diagnosis Date   • Abdominal pain 07/22/2013   • Acute bronchitis 05/21/2015   • Acute maxillary sinusitis 10/04/2012   • Allergic 2001   • Allergic rhinitis 02/20/2015   • Depression 02/17/2016    Depressive disorder      • Drug therapy 02/24/2014    Long-term   • Drug therapy 05/11/2016    Other long term (current)    • Dyslipidemia 08/29/2013   • Encounters for administrative purpose 07/14/2015   • Generalized anxiety disorder 05/11/2016   • Hyperlipidemia 04/14/2015   • Insomnia 05/11/2016   • Irritable bowel syndrome 2013   • Kidney stone 2001   • Low back pain 06/18/2015   • Migraine 04/14/2015   • Osteoarthritis 05/11/2016   • Pneumonia 1978   • Urinary tract infectious disease 06/22/2016          Family History   Problem Relation Age of Onset   • Coronary artery disease Other    • Peripheral vascular disease Mother    • Hypertension Mother    • Arthritis Mother    • Heart failure Mother    • Hyperlipidemia Mother    • Cancer Mother         Cervical cancer   • Depression Mother         Onset around age 80   • Heart disease  "Mother    • Arthritis Father    • Alcohol abuse Father         Committed suicide   • Depression Father    • No Known Problems Sister         Review of Systems   Constitutional: Negative.  Negative for fever and unexpected weight change.   Eyes: Negative.    Respiratory: Negative.  Negative for cough, chest tightness and shortness of breath.    Cardiovascular: Negative.  Negative for chest pain.   Gastrointestinal: Negative.  Negative for constipation, diarrhea and nausea.   Endocrine: Negative.    Genitourinary: Negative.  Negative for difficulty urinating, dysuria and urgency.   Musculoskeletal: Negative.    Skin: Negative.  Negative for color change, pallor, rash and wound.   Allergic/Immunologic: Negative.    Neurological: Positive for headaches (chronic migraines since age 13).   Hematological: Negative.    Psychiatric/Behavioral: Positive for dysphoric mood and sleep disturbance (controlled with Trazodone). Negative for suicidal ideas.   All other systems reviewed and are negative.      Objective   Vitals:    01/16/20 1456   BP: 120/76   BP Location: Left arm   Patient Position: Sitting   Cuff Size: Adult   Pulse: 109   Resp: 16   Temp: 98 °F (36.7 °C)   TempSrc: Tympanic   SpO2: 97%   Weight: 90.5 kg (199 lb 9.6 oz)   Height: 175.3 cm (69\")   PainSc: 0-No pain     Physical Exam   Constitutional: She is oriented to person, place, and time. She appears well-developed and well-nourished.   HENT:   Head: Normocephalic and atraumatic.   Eyes: Pupils are equal, round, and reactive to light. Conjunctivae are normal.   Neck: Normal range of motion. Neck supple.   Cardiovascular: Normal rate, regular rhythm, normal heart sounds and intact distal pulses. Exam reveals no gallop and no friction rub.   No murmur heard.  Pulmonary/Chest: Effort normal and breath sounds normal. No respiratory distress. She has no wheezes. She has no rales. She exhibits no tenderness.   Abdominal: Soft. Bowel sounds are normal. "   Musculoskeletal: Normal range of motion. She exhibits no edema, tenderness or deformity.   Lymphadenopathy:     She has no cervical adenopathy.   Neurological: She is alert and oriented to person, place, and time.   Skin: Skin is warm and dry. Capillary refill takes 2 to 3 seconds. No erythema. No pallor.   Psychiatric: She has a normal mood and affect. Her behavior is normal. Judgment and thought content normal.   Nursing note and vitals reviewed.      Assessment/Plan   Blaire was seen today for hyperlipidemia.    Diagnoses and all orders for this visit:    Vitamin D deficiency  -     cholecalciferol (VITAMIN D3) 1.25 MG (64924 UT) capsule; Take 1 capsule by mouth Every 7 (Seven) Days. For vitamin D deficiency  Repeat labs every time bottle is empty.  -     Vitamin D 25 Hydroxy; Future    Mixed hyperlipidemia  -     atorvastatin (LIPITOR) 80 MG tablet; Take 1 tablet by mouth Daily. For cholesterol. Repeat labs when bottle empty.  -     Lipid Panel; Future           PHQ-2/PHQ-9 Depression Screening 10/14/2019   Little interest or pleasure in doing things 0   Feeling down, depressed, or hopeless 0   Trouble falling or staying asleep, or sleeping too much 0   Feeling tired or having little energy 0   Poor appetite or overeating 0   Feeling bad about yourself - or that you are a failure or have let yourself or your family down 0   Trouble concentrating on things, such as reading the newspaper or watching television 0   Moving or speaking so slowly that other people could have noticed. Or the opposite - being so fidgety or restless that you have been moving around a lot more than usual 0   Thoughts that you would be better off dead, or of hurting yourself in some way 0   Total Score 0   If you checked off any problems, how difficult have these problems made it for you to do your work, take care of things at home, or get along with other people? -         Jeannie Werner, APRPARRISH         Return in about 3 months (around  4/16/2020).    Patient Instructions   Stop simvastatin, start atorvastatin 80mg daily for cholesterol  Start vitamin D 3 replacement weekly    Follow up in 3 months with labs done 1-2 days ahead.

## 2020-02-04 DIAGNOSIS — E78.5 HYPERLIPIDEMIA, UNSPECIFIED HYPERLIPIDEMIA TYPE: ICD-10-CM

## 2020-02-04 RX ORDER — PRAVASTATIN SODIUM 80 MG/1
TABLET ORAL
Qty: 90 TABLET | Refills: 1 | OUTPATIENT
Start: 2020-02-04

## 2020-02-05 DIAGNOSIS — E78.5 HYPERLIPIDEMIA, UNSPECIFIED HYPERLIPIDEMIA TYPE: ICD-10-CM

## 2020-02-10 RX ORDER — PRAVASTATIN SODIUM 80 MG/1
TABLET ORAL
Qty: 90 TABLET | Refills: 1 | Status: SHIPPED | OUTPATIENT
Start: 2020-02-10 | End: 2020-05-01 | Stop reason: ALTCHOICE

## 2020-03-06 DIAGNOSIS — G43.709 CHRONIC MIGRAINE WITHOUT AURA WITHOUT STATUS MIGRAINOSUS, NOT INTRACTABLE: ICD-10-CM

## 2020-03-06 RX ORDER — RIZATRIPTAN BENZOATE 10 MG/1
TABLET ORAL
Qty: 9 TABLET | Refills: 5 | Status: SHIPPED | OUTPATIENT
Start: 2020-03-06 | End: 2020-07-06

## 2020-04-28 ENCOUNTER — LAB (OUTPATIENT)
Dept: LAB | Facility: OTHER | Age: 55
End: 2020-04-28

## 2020-04-28 DIAGNOSIS — E55.9 VITAMIN D DEFICIENCY: ICD-10-CM

## 2020-04-28 DIAGNOSIS — E78.2 MIXED HYPERLIPIDEMIA: ICD-10-CM

## 2020-04-28 LAB
CHOLEST SERPL-MCNC: 159 MG/DL (ref 150–200)
HDLC SERPL-MCNC: 60 MG/DL (ref 40–59)
LDLC SERPL CALC-MCNC: 79 MG/DL
LDLC/HDLC SERPL: 1.32 {RATIO} (ref 0–3.22)
TRIGL SERPL-MCNC: 99 MG/DL
VLDLC SERPL-MCNC: 19.8 MG/DL

## 2020-04-28 PROCEDURE — 36415 COLL VENOUS BLD VENIPUNCTURE: CPT | Performed by: NURSE PRACTITIONER

## 2020-04-28 PROCEDURE — 82306 VITAMIN D 25 HYDROXY: CPT | Performed by: NURSE PRACTITIONER

## 2020-04-28 PROCEDURE — 80061 LIPID PANEL: CPT | Performed by: NURSE PRACTITIONER

## 2020-04-29 LAB — 25(OH)D3 SERPL-MCNC: 58.7 NG/ML (ref 30–100)

## 2020-05-01 ENCOUNTER — TELEMEDICINE (OUTPATIENT)
Dept: FAMILY MEDICINE CLINIC | Facility: CLINIC | Age: 55
End: 2020-05-01

## 2020-05-01 DIAGNOSIS — R73.9 HYPERGLYCEMIA: ICD-10-CM

## 2020-05-01 DIAGNOSIS — F41.9 ANXIETY: ICD-10-CM

## 2020-05-01 DIAGNOSIS — E78.2 MIXED HYPERLIPIDEMIA: ICD-10-CM

## 2020-05-01 DIAGNOSIS — E55.9 VITAMIN D DEFICIENCY: Primary | ICD-10-CM

## 2020-05-01 DIAGNOSIS — Z13.29 SCREENING FOR THYROID DISORDER: ICD-10-CM

## 2020-05-01 DIAGNOSIS — Z13.21 ENCOUNTER FOR VITAMIN DEFICIENCY SCREENING: ICD-10-CM

## 2020-05-01 PROCEDURE — 99213 OFFICE O/P EST LOW 20 MIN: CPT | Performed by: NURSE PRACTITIONER

## 2020-05-01 RX ORDER — ATORVASTATIN CALCIUM 80 MG/1
80 TABLET, FILM COATED ORAL DAILY
Qty: 90 TABLET | Refills: 1 | Status: SHIPPED | OUTPATIENT
Start: 2020-05-01 | End: 2020-10-19

## 2020-05-01 NOTE — PROGRESS NOTES
Chief Complaint   Patient presents with   • Results     Subjective   Blaire Anotnio Sullivan is a 54 y.o. female who presents to the office by video visit due to pandemic for review of labs, vitamin D deficiency and hyperlipidemia.    The following portions of the patient's history were reviewed and updated as appropriate: allergies, current medications, past family history, past medical history, past social history, past surgical history and problem list.    History of Present Illness   You have chosen to receive care through a telehealth visit.  Do you consent to use a video/audio connection for your medical care today? Yes  This was an audio and video enabled telemedicine encounter.    Recent labs discussed and no treatment changes are required.  Requests refill of atorvastatin today.    No new problems.  Parts of most recent relevant visit HPI, ROS  and PE may be carried forward and all are updated as appropriate for current situation.    Past Medical History:   Diagnosis Date   • Abdominal pain 07/22/2013   • Acute bronchitis 05/21/2015   • Acute maxillary sinusitis 10/04/2012   • Allergic 2001   • Allergic rhinitis 02/20/2015   • Depression 02/17/2016    Depressive disorder      • Drug therapy 02/24/2014    Long-term   • Drug therapy 05/11/2016    Other long term (current)    • Dyslipidemia 08/29/2013   • Encounters for administrative purpose 07/14/2015   • Generalized anxiety disorder 05/11/2016   • Hyperlipidemia 04/14/2015   • Insomnia 05/11/2016   • Irritable bowel syndrome 2013   • Kidney stone 2001   • Low back pain 06/18/2015   • Migraine 04/14/2015   • Osteoarthritis 05/11/2016   • Pneumonia 1978   • Urinary tract infectious disease 06/22/2016          Family History   Problem Relation Age of Onset   • Coronary artery disease Other    • Peripheral vascular disease Mother    • Hypertension Mother    • Arthritis Mother    • Heart failure Mother    • Hyperlipidemia Mother    • Cancer Mother         Cervical  cancer   • Depression Mother         Onset around age 80   • Heart disease Mother    • Arthritis Father    • Alcohol abuse Father         Committed suicide   • Depression Father    • No Known Problems Sister         Review of Systems   Constitutional: Negative.  Negative for fever and unexpected weight change.   Eyes: Negative.    Respiratory: Negative.  Negative for cough, chest tightness and shortness of breath.    Cardiovascular: Negative.  Negative for chest pain.   Gastrointestinal: Negative.  Negative for constipation, diarrhea and nausea.   Endocrine: Negative.    Genitourinary: Negative.  Negative for difficulty urinating, dysuria and urgency.   Musculoskeletal: Negative.    Skin: Negative.  Negative for color change, pallor, rash and wound.   Allergic/Immunologic: Negative.    Neurological: Positive for headaches (chronic migraines since age 13).   Hematological: Negative.    Psychiatric/Behavioral: Positive for dysphoric mood and sleep disturbance (controlled with Trazodone). Negative for suicidal ideas.   All other systems reviewed and are negative.      Objective   There were no vitals filed for this visit.  Physical Exam   Constitutional: She is oriented to person, place, and time. She appears well-developed and well-nourished. No distress.   HENT:   Head: Normocephalic and atraumatic.   Eyes: Pupils are equal, round, and reactive to light. Conjunctivae and EOM are normal. Right eye exhibits no discharge. Left eye exhibits no discharge. No scleral icterus.   Neck: Normal range of motion. Neck supple. No tracheal deviation present.   Pulmonary/Chest: Effort normal. No respiratory distress. She has no wheezes.   Musculoskeletal: Normal range of motion. She exhibits no edema, tenderness or deformity.   Neurological: She is alert and oriented to person, place, and time. No cranial nerve deficit.   Skin: Skin is dry. No rash noted. She is not diaphoretic. No erythema. No pallor.   Psychiatric: She has a  normal mood and affect. Her behavior is normal. Thought content normal.       Assessment/Plan   Blaire was seen today for results.    Diagnoses and all orders for this visit:    Vitamin D deficiency  -     Vitamin D 25 Hydroxy; Future    Mixed hyperlipidemia  -     atorvastatin (Lipitor) 80 MG tablet; Take 1 tablet by mouth Daily. For cholesterol.  -     Lipid Panel; Future    Hyperglycemia  -     Comprehensive Metabolic Panel; Future  -     CBC & Differential; Future    Anxiety  -     T4, Free; Future  -     TSH; Future  -     T3; Future    Screening for thyroid disorder  -     T4, Free; Future  -     TSH; Future  -     T3; Future    Encounter for vitamin deficiency screening  -     Vitamin B12 & Folate; Future           PHQ-2/PHQ-9 Depression Screening 10/14/2019   Little interest or pleasure in doing things 0   Feeling down, depressed, or hopeless 0   Trouble falling or staying asleep, or sleeping too much 0   Feeling tired or having little energy 0   Poor appetite or overeating 0   Feeling bad about yourself - or that you are a failure or have let yourself or your family down 0   Trouble concentrating on things, such as reading the newspaper or watching television 0   Moving or speaking so slowly that other people could have noticed. Or the opposite - being so fidgety or restless that you have been moving around a lot more than usual 0   Thoughts that you would be better off dead, or of hurting yourself in some way 0   Total Score 0   If you checked off any problems, how difficult have these problems made it for you to do your work, take care of things at home, or get along with other people? -       ALBERTO Glez         Return in about 6 months (around 11/1/2020) for Next scheduled follow up.    There are no Patient Instructions on file for this visit.    Lab on 04/28/2020   Component Date Value Ref Range Status   • 25 Hydroxy, Vitamin D 04/28/2020 58.7  30.0 - 100.0 ng/ml Final   • Total Cholesterol  04/28/2020 159  150 - 200 mg/dL Final   • Triglycerides 04/28/2020 99  <=150 mg/dL Final   • HDL Cholesterol 04/28/2020 60* 40 - 59 mg/dL Final   • LDL Cholesterol  04/28/2020 79  <=100 mg/dL Final   • VLDL Cholesterol 04/28/2020 19.8  mg/dL Final   • LDL/HDL Ratio 04/28/2020 1.32  0.00 - 3.22 Final   Lab on 01/15/2020   Component Date Value Ref Range Status   • Total Cholesterol 01/15/2020 265* 150 - 200 mg/dL Final   • Triglycerides 01/15/2020 201* <=150 mg/dL Final   • HDL Cholesterol 01/15/2020 66* 40 - 59 mg/dL Final   • LDL Cholesterol  01/15/2020 159* <=100 mg/dL Final   • VLDL Cholesterol 01/15/2020 40.2  mg/dL Final   • LDL/HDL Ratio 01/15/2020 2.41  0.00 - 3.22 Final   ]

## 2020-06-21 DIAGNOSIS — E55.9 VITAMIN D DEFICIENCY: ICD-10-CM

## 2020-06-22 RX ORDER — CHOLECALCIFEROL (VITAMIN D3) 1250 MCG
CAPSULE ORAL
Qty: 12 CAPSULE | Refills: 1 | Status: SHIPPED | OUTPATIENT
Start: 2020-06-22 | End: 2020-11-13 | Stop reason: SDUPTHER

## 2020-07-02 DIAGNOSIS — G43.709 CHRONIC MIGRAINE WITHOUT AURA WITHOUT STATUS MIGRAINOSUS, NOT INTRACTABLE: ICD-10-CM

## 2020-07-06 RX ORDER — RIZATRIPTAN BENZOATE 10 MG/1
TABLET ORAL
Qty: 9 TABLET | Refills: 5 | Status: SHIPPED | OUTPATIENT
Start: 2020-07-06 | End: 2020-09-22

## 2020-09-21 DIAGNOSIS — G43.709 CHRONIC MIGRAINE WITHOUT AURA WITHOUT STATUS MIGRAINOSUS, NOT INTRACTABLE: ICD-10-CM

## 2020-09-22 RX ORDER — RIZATRIPTAN BENZOATE 10 MG/1
TABLET ORAL
Qty: 9 TABLET | Refills: 5 | Status: SHIPPED | OUTPATIENT
Start: 2020-09-22 | End: 2020-11-13 | Stop reason: SDUPTHER

## 2020-10-17 DIAGNOSIS — E78.2 MIXED HYPERLIPIDEMIA: ICD-10-CM

## 2020-10-19 RX ORDER — ATORVASTATIN CALCIUM 80 MG/1
80 TABLET, FILM COATED ORAL DAILY
Qty: 90 TABLET | Refills: 1 | Status: SHIPPED | OUTPATIENT
Start: 2020-10-19 | End: 2020-11-13 | Stop reason: SDUPTHER

## 2020-11-09 ENCOUNTER — LAB (OUTPATIENT)
Dept: LAB | Facility: OTHER | Age: 55
End: 2020-11-09

## 2020-11-09 DIAGNOSIS — E78.2 MIXED HYPERLIPIDEMIA: ICD-10-CM

## 2020-11-09 DIAGNOSIS — F41.9 ANXIETY: ICD-10-CM

## 2020-11-09 DIAGNOSIS — R73.9 HYPERGLYCEMIA: ICD-10-CM

## 2020-11-09 DIAGNOSIS — E55.9 VITAMIN D DEFICIENCY: ICD-10-CM

## 2020-11-09 DIAGNOSIS — Z13.21 ENCOUNTER FOR VITAMIN DEFICIENCY SCREENING: ICD-10-CM

## 2020-11-09 DIAGNOSIS — Z13.29 SCREENING FOR THYROID DISORDER: ICD-10-CM

## 2020-11-09 LAB
ALBUMIN SERPL-MCNC: 4.2 G/DL (ref 3.5–5)
ALBUMIN/GLOB SERPL: 1.4 G/DL (ref 1.1–1.8)
ALP SERPL-CCNC: 79 U/L (ref 38–126)
ALT SERPL W P-5'-P-CCNC: 22 U/L
ANION GAP SERPL CALCULATED.3IONS-SCNC: 8 MMOL/L (ref 5–15)
AST SERPL-CCNC: 25 U/L (ref 14–36)
BASOPHILS # BLD AUTO: 0.05 10*3/MM3 (ref 0–0.2)
BASOPHILS NFR BLD AUTO: 0.7 % (ref 0–1.5)
BILIRUB SERPL-MCNC: 0.4 MG/DL (ref 0.2–1.3)
BUN SERPL-MCNC: 18 MG/DL (ref 7–23)
BUN/CREAT SERPL: 21.2 (ref 7–25)
CALCIUM SPEC-SCNC: 9.6 MG/DL (ref 8.4–10.2)
CHLORIDE SERPL-SCNC: 105 MMOL/L (ref 101–112)
CHOLEST SERPL-MCNC: 158 MG/DL (ref 150–200)
CO2 SERPL-SCNC: 24 MMOL/L (ref 22–30)
CREAT SERPL-MCNC: 0.85 MG/DL (ref 0.52–1.04)
DEPRECATED RDW RBC AUTO: 46 FL (ref 37–54)
EOSINOPHIL # BLD AUTO: 0.24 10*3/MM3 (ref 0–0.4)
EOSINOPHIL NFR BLD AUTO: 3.5 % (ref 0.3–6.2)
ERYTHROCYTE [DISTWIDTH] IN BLOOD BY AUTOMATED COUNT: 14.5 % (ref 12.3–15.4)
GFR SERPL CREATININE-BSD FRML MDRD: 70 ML/MIN/1.73 (ref 51–120)
GLOBULIN UR ELPH-MCNC: 3 GM/DL (ref 2.3–3.5)
GLUCOSE SERPL-MCNC: 97 MG/DL (ref 70–99)
HCT VFR BLD AUTO: 39.5 % (ref 34–46.6)
HDLC SERPL-MCNC: 62 MG/DL (ref 40–59)
HGB BLD-MCNC: 12.6 G/DL (ref 12–15.9)
LDLC SERPL CALC-MCNC: 77 MG/DL
LDLC/HDLC SERPL: 1.21 {RATIO} (ref 0–3.22)
LYMPHOCYTES # BLD AUTO: 2.36 10*3/MM3 (ref 0.7–3.1)
LYMPHOCYTES NFR BLD AUTO: 34.8 % (ref 19.6–45.3)
MCH RBC QN AUTO: 28.1 PG (ref 26.6–33)
MCHC RBC AUTO-ENTMCNC: 31.9 G/DL (ref 31.5–35.7)
MCV RBC AUTO: 88.2 FL (ref 79–97)
MONOCYTES # BLD AUTO: 0.49 10*3/MM3 (ref 0.1–0.9)
MONOCYTES NFR BLD AUTO: 7.2 % (ref 5–12)
NEUTROPHILS NFR BLD AUTO: 3.64 10*3/MM3 (ref 1.7–7)
NEUTROPHILS NFR BLD AUTO: 53.8 % (ref 42.7–76)
PLATELET # BLD AUTO: 282 10*3/MM3 (ref 140–450)
PMV BLD AUTO: 9.8 FL (ref 6–12)
POTASSIUM SERPL-SCNC: 4.2 MMOL/L (ref 3.4–5)
PROT SERPL-MCNC: 7.2 G/DL (ref 6.3–8.6)
RBC # BLD AUTO: 4.48 10*6/MM3 (ref 3.77–5.28)
SODIUM SERPL-SCNC: 137 MMOL/L (ref 137–145)
TRIGL SERPL-MCNC: 106 MG/DL
VLDLC SERPL-MCNC: 19 MG/DL (ref 5–40)
WBC # BLD AUTO: 6.78 10*3/MM3 (ref 3.4–10.8)

## 2020-11-09 PROCEDURE — 36415 COLL VENOUS BLD VENIPUNCTURE: CPT | Performed by: NURSE PRACTITIONER

## 2020-11-09 PROCEDURE — 80053 COMPREHEN METABOLIC PANEL: CPT | Performed by: NURSE PRACTITIONER

## 2020-11-09 PROCEDURE — 80061 LIPID PANEL: CPT | Performed by: NURSE PRACTITIONER

## 2020-11-09 PROCEDURE — 82306 VITAMIN D 25 HYDROXY: CPT | Performed by: NURSE PRACTITIONER

## 2020-11-09 PROCEDURE — 84439 ASSAY OF FREE THYROXINE: CPT | Performed by: NURSE PRACTITIONER

## 2020-11-09 PROCEDURE — 85025 COMPLETE CBC W/AUTO DIFF WBC: CPT | Performed by: NURSE PRACTITIONER

## 2020-11-09 PROCEDURE — 82607 VITAMIN B-12: CPT | Performed by: NURSE PRACTITIONER

## 2020-11-09 PROCEDURE — 84480 ASSAY TRIIODOTHYRONINE (T3): CPT | Performed by: NURSE PRACTITIONER

## 2020-11-09 PROCEDURE — 84443 ASSAY THYROID STIM HORMONE: CPT | Performed by: NURSE PRACTITIONER

## 2020-11-09 PROCEDURE — 82746 ASSAY OF FOLIC ACID SERUM: CPT | Performed by: NURSE PRACTITIONER

## 2020-11-10 LAB
25(OH)D3 SERPL-MCNC: 56.2 NG/ML (ref 30–100)
FOLATE SERPL-MCNC: 7.48 NG/ML (ref 4.78–24.2)
T3 SERPL-MCNC: 112 NG/DL (ref 80–200)
T4 FREE SERPL-MCNC: 1.05 NG/DL (ref 0.93–1.7)
TSH SERPL DL<=0.05 MIU/L-ACNC: 2.11 UIU/ML (ref 0.27–4.2)
VIT B12 BLD-MCNC: 383 PG/ML (ref 211–946)

## 2020-11-13 ENCOUNTER — OFFICE VISIT (OUTPATIENT)
Dept: FAMILY MEDICINE CLINIC | Facility: CLINIC | Age: 55
End: 2020-11-13

## 2020-11-13 VITALS — BODY MASS INDEX: 29.47 KG/M2 | WEIGHT: 199 LBS | HEIGHT: 69 IN

## 2020-11-13 DIAGNOSIS — Z13.29 SCREENING FOR THYROID DISORDER: ICD-10-CM

## 2020-11-13 DIAGNOSIS — Z79.899 ENCOUNTER FOR LONG-TERM CURRENT USE OF MEDICATION: ICD-10-CM

## 2020-11-13 DIAGNOSIS — E78.2 MIXED HYPERLIPIDEMIA: ICD-10-CM

## 2020-11-13 DIAGNOSIS — E53.8 LOW SERUM VITAMIN B12: Primary | ICD-10-CM

## 2020-11-13 DIAGNOSIS — G43.709 CHRONIC MIGRAINE WITHOUT AURA WITHOUT STATUS MIGRAINOSUS, NOT INTRACTABLE: ICD-10-CM

## 2020-11-13 DIAGNOSIS — F51.01 PRIMARY INSOMNIA: ICD-10-CM

## 2020-11-13 DIAGNOSIS — E55.9 VITAMIN D DEFICIENCY: ICD-10-CM

## 2020-11-13 PROCEDURE — 99442 PR PHYS/QHP TELEPHONE EVALUATION 11-20 MIN: CPT | Performed by: NURSE PRACTITIONER

## 2020-11-13 RX ORDER — CHOLECALCIFEROL (VITAMIN D3) 1250 MCG
50000 CAPSULE ORAL
Qty: 12 CAPSULE | Refills: 1 | Status: SHIPPED | OUTPATIENT
Start: 2020-11-13 | End: 2021-05-13 | Stop reason: SDUPTHER

## 2020-11-13 RX ORDER — FLUOXETINE HYDROCHLORIDE 60 MG/1
TABLET, FILM COATED ORAL; ORAL
COMMUNITY
End: 2021-09-20

## 2020-11-13 RX ORDER — ATORVASTATIN CALCIUM 80 MG/1
80 TABLET, FILM COATED ORAL DAILY
Qty: 90 TABLET | Refills: 1 | Status: SHIPPED | OUTPATIENT
Start: 2020-11-13 | End: 2021-05-13 | Stop reason: SDUPTHER

## 2020-11-13 RX ORDER — RIZATRIPTAN BENZOATE 10 MG/1
10 TABLET ORAL ONCE AS NEEDED
Qty: 9 TABLET | Refills: 5 | Status: SHIPPED | OUTPATIENT
Start: 2020-11-13 | End: 2021-03-27 | Stop reason: SDUPTHER

## 2020-11-13 NOTE — PROGRESS NOTES
Chief Complaint   Patient presents with   • Follow-up     3 month-   • Results     labs     Subjective   Blaire Sullivan is a 55 y.o. female who presents to the office by telephone visit due to COVID-19 pandemic for routine follow-up of chronic conditions with recent lab results.    The following portions of the patient's history were reviewed and updated as appropriate: allergies, current medications, past family history, past medical history, past social history, past surgical history and problem list.    You have chosen to receive care through a telephone visit. Do you consent to use a telephone visit for your medical care today? Yes  12 minutes medical discussion.     History of Present Illness Answers for HPI/ROS submitted by the patient on 11/12/2020   What is the primary reason for your visit?: Other  Please describe your symptoms.: Lab results  Have you had these symptoms before?: No  How long have you been having these symptoms?: 1-4 days    Vitamin D deficiency managed with weekly vitamin D3 replacement 50,000 units every 7 days.  Corrected to normal values.  Due for refill today.    Vitamin B12 low but not deficient.  Advised to increase consumption of vitamin B12 and natural foods and perhaps take a multivitamin containing B12 or B12 supplement several days per week for optimal levels.    Reviewed all labs from 11/9/2020.  All are normal or stable save the minimally low B12 level.    Chronic anxiety is managed by behavioral health provider at [facility] in [place].  Medications prescribed by that provider include Abilify 5 mg daily, Wellbutrin  mg p.o. daily, Prozac 60 mg p.o. daily, Minipress 2 mg p.o. nightly.  Reports emotions are well controlled with these medications and routine talk therapy with her behavioral health provider.  Denies suicidal or homicidal thoughts.    Next hyperlipidemia chronic.  Onset more than 1 year ago.  Managed with atorvastatin 80 mg p.o. daily and  corrected to goal LDL.    Chronic headache syndrome, onset in childhood.  Reports headaches maybe once every 2 weeks interrupted with as needed Maxalt.  Reports severity is tolerable with current medications.  Feels satisfied with current treatment.  Due for refill today.    Chronic insomnia onset more than 1 year ago.  Managed with Ambien 10 mg p.o. daily at bedtime prescribed by behavioral health provider at [facility].  Denies adverse effects of medication and reports adequate restorative sleep with medication use.    No new complaints today.    Parts of most recent relevant visit HPI, ROS  and PE may be carried forward and all are updated as appropriate for current situation.      Past Medical History:   Diagnosis Date   • Abdominal pain 07/22/2013   • Acute bronchitis 05/21/2015   • Acute maxillary sinusitis 10/04/2012   • Allergic 2001   • Allergic rhinitis 02/20/2015   • Depression 02/17/2016    Depressive disorder      • Drug therapy 02/24/2014    Long-term   • Drug therapy 05/11/2016    Other long term (current)    • Dyslipidemia 08/29/2013   • Encounters for administrative purpose 07/14/2015   • Generalized anxiety disorder 05/11/2016   • Hyperlipidemia 04/14/2015   • Insomnia 05/11/2016   • Irritable bowel syndrome 2013   • Kidney stone 2001   • Low back pain 06/18/2015   • Migraine 04/14/2015   • Osteoarthritis 05/11/2016   • Pneumonia 1978   • Urinary tract infectious disease 06/22/2016          Family History   Problem Relation Age of Onset   • Coronary artery disease Other    • Peripheral vascular disease Mother    • Hypertension Mother    • Arthritis Mother    • Heart failure Mother    • Hyperlipidemia Mother    • Cancer Mother         Cervical cancer   • Depression Mother         Onset around age 80   • Heart disease Mother    • Arthritis Father    • Alcohol abuse Father         Committed suicide   • Depression Father    • No Known Problems Sister         Review of  "Systems   Constitutional: Negative.  Negative for fever and unexpected weight change.   Eyes: Negative.    Respiratory: Negative.  Negative for cough, chest tightness and shortness of breath.    Cardiovascular: Negative.  Negative for chest pain.   Gastrointestinal: Negative.  Negative for constipation, diarrhea and nausea.   Endocrine: Negative.    Genitourinary: Negative.  Negative for difficulty urinating, dysuria and urgency.   Musculoskeletal: Negative.    Skin: Negative.  Negative for color change, pallor, rash and wound.   Allergic/Immunologic: Negative.    Neurological: Positive for headaches (chronic migraines since age 13).   Hematological: Negative.    Psychiatric/Behavioral: Positive for dysphoric mood and sleep disturbance (controlled with Trazodone). Negative for suicidal ideas.   All other systems reviewed and are negative.      Objective   Vitals:    11/13/20 1300   Weight: 90.3 kg (199 lb)   Height: 175.3 cm (69.02\")   PainSc: 0-No pain     Physical Exam  Constitutional:       General: She is not in acute distress.  Pulmonary:      Effort: Pulmonary effort is normal. No respiratory distress.      Breath sounds: No stridor.   Neurological:      Mental Status: She is oriented to person, place, and time.   Psychiatric:         Behavior: Behavior normal.         Thought Content: Thought content normal.         Judgment: Judgment normal.         Assessment/Plan   Diagnoses and all orders for this visit:    1. Low serum vitamin B12 (Primary)  -     Vitamin B12 & Folate; Future    2. Vitamin D deficiency  -     Cholecalciferol (Vitamin D3) 1.25 MG (35891 UT) capsule; Take 1 capsule by mouth Every 7 (Seven) Days.  Dispense: 12 capsule; Refill: 1  -     Vitamin D 25 Hydroxy; Future    3. Primary insomnia    4. Chronic migraine without aura without status migrainosus, not intractable  -     rizatriptan (MAXALT) 10 MG tablet; Take 1 tablet by mouth 1 (One) Time As Needed for Migraine for up to 1 dose. May " repeat in 2 hours if needed  Dispense: 9 tablet; Refill: 5    5. Mixed hyperlipidemia  -     atorvastatin (LIPITOR) 80 MG tablet; Take 1 tablet by mouth Daily. For cholesterol.  Dispense: 90 tablet; Refill: 1  -     Lipid Panel; Future    6. Screening for thyroid disorder  -     T4, Free; Future  -     TSH; Future  -     T3; Future    7. Encounter for long-term current use of medication  -     CBC & Differential; Future  -     Comprehensive Metabolic Panel; Future         Stable hyperlipidemia corrected to goal, stable vitamin D deficiency corrected to goal, minimally depressed level of B12 dietary or supplement recommendations given today.  All of labs from 11/9/2020 were normal or stable other than minimally low B12 level, all discussed with patient today.    Follow-up in 6 months.  Will need labs 1 to 2 days prior to next visit which is scheduled for May 13, 2021  PHQ-2/PHQ-9 Depression Screening 10/14/2019   Little interest or pleasure in doing things 0   Feeling down, depressed, or hopeless 0   Trouble falling or staying asleep, or sleeping too much 0   Feeling tired or having little energy 0   Poor appetite or overeating 0   Feeling bad about yourself - or that you are a failure or have let yourself or your family down 0   Trouble concentrating on things, such as reading the newspaper or watching television 0   Moving or speaking so slowly that other people could have noticed. Or the opposite - being so fidgety or restless that you have been moving around a lot more than usual 0   Thoughts that you would be better off dead, or of hurting yourself in some way 0   Total Score 0   If you checked off any problems, how difficult have these problems made it for you to do your work, take care of things at home, or get along with other people? -       ALBERTO Glez         Return in about 6 months (around 5/13/2021).    There are no Patient Instructions on file for this visit.    Lab on 11/09/2020   Component  Date Value Ref Range Status   • Glucose 11/09/2020 97  70 - 99 mg/dL Final   • BUN 11/09/2020 18  7 - 23 mg/dL Final   • Creatinine 11/09/2020 0.85  0.52 - 1.04 mg/dL Final   • Sodium 11/09/2020 137  137 - 145 mmol/L Final   • Potassium 11/09/2020 4.2  3.4 - 5.0 mmol/L Final   • Chloride 11/09/2020 105  101 - 112 mmol/L Final   • CO2 11/09/2020 24.0  22.0 - 30.0 mmol/L Final   • Calcium 11/09/2020 9.6  8.4 - 10.2 mg/dL Final   • Total Protein 11/09/2020 7.2  6.3 - 8.6 g/dL Final   • Albumin 11/09/2020 4.20  3.50 - 5.00 g/dL Final   • ALT (SGPT) 11/09/2020 22  <=35 U/L Final   • AST (SGOT) 11/09/2020 25  14 - 36 U/L Final   • Alkaline Phosphatase 11/09/2020 79  38 - 126 U/L Final   • Total Bilirubin 11/09/2020 0.4  0.2 - 1.3 mg/dL Final   • eGFR Non African Amer 11/09/2020 70  51 - 120 mL/min/1.73 Final   • Globulin 11/09/2020 3.0  2.3 - 3.5 gm/dL Final   • A/G Ratio 11/09/2020 1.4  1.1 - 1.8 g/dL Final   • BUN/Creatinine Ratio 11/09/2020 21.2  7.0 - 25.0 Final   • Anion Gap 11/09/2020 8.0  5.0 - 15.0 mmol/L Final   • Total Cholesterol 11/09/2020 158  150 - 200 mg/dL Final   • Triglycerides 11/09/2020 106  <=150 mg/dL Final   • HDL Cholesterol 11/09/2020 62* 40 - 59 mg/dL Final   • LDL Cholesterol  11/09/2020 77  <=100 mg/dL Final   • VLDL Cholesterol 11/09/2020 19  5 - 40 mg/dL Final   • LDL/HDL Ratio 11/09/2020 1.21  0.00 - 3.22 Final   • Free T4 11/09/2020 1.05  0.93 - 1.70 ng/dL Final   • TSH 11/09/2020 2.110  0.270 - 4.200 uIU/mL Final   • T3, Total 11/09/2020 112.0  80.0 - 200.0 ng/dl Final   • 25 Hydroxy, Vitamin D 11/09/2020 56.2  30.0 - 100.0 ng/ml Final   • Folate 11/09/2020 7.48  4.78 - 24.20 ng/mL Final   • Vitamin B-12 11/09/2020 383  211 - 946 pg/mL Final   • WBC 11/09/2020 6.78  3.40 - 10.80 10*3/mm3 Final   • RBC 11/09/2020 4.48  3.77 - 5.28 10*6/mm3 Final   • Hemoglobin 11/09/2020 12.6  12.0 - 15.9 g/dL Final   • Hematocrit 11/09/2020 39.5  34.0 - 46.6 % Final   • MCV 11/09/2020 88.2  79.0 - 97.0 fL  Final   • MCH 11/09/2020 28.1  26.6 - 33.0 pg Final   • MCHC 11/09/2020 31.9  31.5 - 35.7 g/dL Final   • RDW 11/09/2020 14.5  12.3 - 15.4 % Final   • RDW-SD 11/09/2020 46.0  37.0 - 54.0 fl Final   • MPV 11/09/2020 9.8  6.0 - 12.0 fL Final   • Platelets 11/09/2020 282  140 - 450 10*3/mm3 Final   • Neutrophil % 11/09/2020 53.8  42.7 - 76.0 % Final   • Lymphocyte % 11/09/2020 34.8  19.6 - 45.3 % Final   • Monocyte % 11/09/2020 7.2  5.0 - 12.0 % Final   • Eosinophil % 11/09/2020 3.5  0.3 - 6.2 % Final   • Basophil % 11/09/2020 0.7  0.0 - 1.5 % Final   • Neutrophils, Absolute 11/09/2020 3.64  1.70 - 7.00 10*3/mm3 Final   • Lymphocytes, Absolute 11/09/2020 2.36  0.70 - 3.10 10*3/mm3 Final   • Monocytes, Absolute 11/09/2020 0.49  0.10 - 0.90 10*3/mm3 Final   • Eosinophils, Absolute 11/09/2020 0.24  0.00 - 0.40 10*3/mm3 Final   • Basophils, Absolute 11/09/2020 0.05  0.00 - 0.20 10*3/mm3 Final   ]

## 2021-03-08 RX ORDER — TRIAMCINOLONE ACETONIDE 5 MG/G
CREAM TOPICAL 3 TIMES DAILY
Qty: 1 G | Refills: 0 | Status: SHIPPED | OUTPATIENT
Start: 2021-03-08

## 2021-03-26 DIAGNOSIS — G43.709 CHRONIC MIGRAINE WITHOUT AURA WITHOUT STATUS MIGRAINOSUS, NOT INTRACTABLE: ICD-10-CM

## 2021-03-27 RX ORDER — RIZATRIPTAN BENZOATE 10 MG/1
TABLET ORAL
Qty: 9 TABLET | Refills: 5 | Status: SHIPPED | OUTPATIENT
Start: 2021-03-27 | End: 2021-05-13 | Stop reason: SDUPTHER

## 2021-05-11 ENCOUNTER — LAB (OUTPATIENT)
Dept: LAB | Facility: OTHER | Age: 56
End: 2021-05-11

## 2021-05-11 DIAGNOSIS — E53.8 LOW SERUM VITAMIN B12: ICD-10-CM

## 2021-05-11 DIAGNOSIS — E78.2 MIXED HYPERLIPIDEMIA: ICD-10-CM

## 2021-05-11 DIAGNOSIS — Z13.29 SCREENING FOR THYROID DISORDER: ICD-10-CM

## 2021-05-11 DIAGNOSIS — E55.9 VITAMIN D DEFICIENCY: ICD-10-CM

## 2021-05-11 DIAGNOSIS — Z79.899 ENCOUNTER FOR LONG-TERM CURRENT USE OF MEDICATION: ICD-10-CM

## 2021-05-11 PROCEDURE — 82746 ASSAY OF FOLIC ACID SERUM: CPT | Performed by: NURSE PRACTITIONER

## 2021-05-11 PROCEDURE — 84443 ASSAY THYROID STIM HORMONE: CPT | Performed by: NURSE PRACTITIONER

## 2021-05-11 PROCEDURE — 82607 VITAMIN B-12: CPT | Performed by: NURSE PRACTITIONER

## 2021-05-11 PROCEDURE — 80061 LIPID PANEL: CPT | Performed by: NURSE PRACTITIONER

## 2021-05-11 PROCEDURE — 80053 COMPREHEN METABOLIC PANEL: CPT | Performed by: NURSE PRACTITIONER

## 2021-05-11 PROCEDURE — 85025 COMPLETE CBC W/AUTO DIFF WBC: CPT | Performed by: NURSE PRACTITIONER

## 2021-05-11 PROCEDURE — 84480 ASSAY TRIIODOTHYRONINE (T3): CPT | Performed by: NURSE PRACTITIONER

## 2021-05-11 PROCEDURE — 36415 COLL VENOUS BLD VENIPUNCTURE: CPT | Performed by: NURSE PRACTITIONER

## 2021-05-11 PROCEDURE — 82306 VITAMIN D 25 HYDROXY: CPT | Performed by: NURSE PRACTITIONER

## 2021-05-11 PROCEDURE — 84439 ASSAY OF FREE THYROXINE: CPT | Performed by: NURSE PRACTITIONER

## 2021-05-13 ENCOUNTER — OFFICE VISIT (OUTPATIENT)
Dept: FAMILY MEDICINE CLINIC | Facility: CLINIC | Age: 56
End: 2021-05-13

## 2021-05-13 VITALS
SYSTOLIC BLOOD PRESSURE: 122 MMHG | HEIGHT: 66 IN | BODY MASS INDEX: 33.41 KG/M2 | WEIGHT: 207.9 LBS | DIASTOLIC BLOOD PRESSURE: 70 MMHG | HEART RATE: 80 BPM | RESPIRATION RATE: 16 BRPM | OXYGEN SATURATION: 98 %

## 2021-05-13 DIAGNOSIS — F51.01 PRIMARY INSOMNIA: ICD-10-CM

## 2021-05-13 DIAGNOSIS — F41.9 ANXIETY: ICD-10-CM

## 2021-05-13 DIAGNOSIS — E78.2 MIXED HYPERLIPIDEMIA: ICD-10-CM

## 2021-05-13 DIAGNOSIS — Z79.899 ENCOUNTER FOR LONG-TERM CURRENT USE OF MEDICATION: ICD-10-CM

## 2021-05-13 DIAGNOSIS — E55.9 VITAMIN D DEFICIENCY: ICD-10-CM

## 2021-05-13 DIAGNOSIS — G43.709 CHRONIC MIGRAINE WITHOUT AURA WITHOUT STATUS MIGRAINOSUS, NOT INTRACTABLE: ICD-10-CM

## 2021-05-13 DIAGNOSIS — E78.5 HYPERLIPIDEMIA, UNSPECIFIED HYPERLIPIDEMIA TYPE: ICD-10-CM

## 2021-05-13 DIAGNOSIS — R73.9 HYPERGLYCEMIA: ICD-10-CM

## 2021-05-13 DIAGNOSIS — E53.8 LOW SERUM VITAMIN B12: Primary | ICD-10-CM

## 2021-05-13 PROBLEM — M48.061 SPINAL STENOSIS OF LUMBAR REGION: Status: ACTIVE | Noted: 2018-05-30

## 2021-05-13 PROCEDURE — 99213 OFFICE O/P EST LOW 20 MIN: CPT | Performed by: NURSE PRACTITIONER

## 2021-05-13 RX ORDER — RIZATRIPTAN BENZOATE 10 MG/1
10 TABLET ORAL ONCE AS NEEDED
Qty: 9 TABLET | Refills: 5 | Status: SHIPPED | OUTPATIENT
Start: 2021-05-13 | End: 2021-09-14

## 2021-05-13 RX ORDER — VILAZODONE HYDROCHLORIDE 20 MG/1
20 TABLET ORAL DAILY
COMMUNITY

## 2021-05-13 RX ORDER — ATORVASTATIN CALCIUM 80 MG/1
80 TABLET, FILM COATED ORAL DAILY
Qty: 90 TABLET | Refills: 1 | Status: SHIPPED | OUTPATIENT
Start: 2021-05-13 | End: 2021-11-11

## 2021-05-13 RX ORDER — CHOLECALCIFEROL (VITAMIN D3) 1250 MCG
50000 CAPSULE ORAL
Qty: 12 CAPSULE | Refills: 1 | Status: SHIPPED | OUTPATIENT
Start: 2021-05-13 | End: 2021-11-09

## 2021-05-13 NOTE — PROGRESS NOTES
Subjective  Answers for HPI/ROS submitted by the patient on 5/6/2021  Please describe your symptoms.: N/A  Have you had these symptoms before?: No  How long have you been having these symptoms?: 1-4 days  Please list any medications you are currently taking for this condition.: N/A  Please describe any probable cause for these symptoms. : N/A  What is the primary reason for your visit?: Martha Sullivan is a 55 y.o. female.       Chief Complaint   Patient presents with   • Anxiety     6 mo f/u        History of Present Illness   Most recent labs 5/11/2021:  T3 TSH normal.  T4 free slightly low at 0.92 likely just a fluctuation.  Vitamin B12 686  Folate greater than 20  Vitamin D 68.9  Total cholesterol 177, triglycerides 153, HDL 52, LDL 98, VLDL 27, LDL/HDL ratio 1.82.  CMP with glucose 102 otherwise normal  CBC normal    Patient here for routine 6-month follow-up of chronic conditions.  Labs are normal or essentially normal and require no medication changes at this time.  Laboratory findings are discussed with patient at this time.        Hyperlipidemia managed with atorvastatin 80 mg p.o. daily.  With no known heart disease her LDL of 98 is at goal of less than 100.  Continue current medication repeat in 6 months.    Chronic anxiety is managed by behavioral health who prescribed Prozac, prazosin, Wellbutrin XL, Viibryd and Ambien for chronic insomnia.  Patient reports emotions are well controlled and insomnia is well mitigated with current medications.  No adverse effects of medication are reported.    Vitamin D3 deficiency is corrected to optimal goal level of 68.9 on 5/11/2021.  Continue current medications repeat labs in 6 months.    Chronic migraine syndrome managed with triptan for interruption with reported adequate results.  Continue current medication follow-up in 6 months    No new complaints today.      Past Surgical History:   Procedure Laterality Date   • CARPAL TUNNEL RELEASE Bilateral      • COLONOSCOPY  2013   • HYSTERECTOMY     • INJECTION OF MEDICATION  2015    Dexamethasone (1)      • INJECTION OF MEDICATION  2015    Rocephin (1)   • JOINT REPLACEMENT Bilateral     hips   • LAPAROTOMY EXPLORATORY FOR ECTOPIC PREGNANCY     • SPINE SURGERY     • SUBTOTAL HYSTERECTOMY  2011   • TUBAL ABDOMINAL LIGATION        Social History     Socioeconomic History   • Marital status:      Spouse name: Biju   • Number of children: 0   • Years of education: Not on file   • Highest education level: Not on file   Tobacco Use   • Smoking status: Former Smoker     Packs/day: 0.50     Years: 27.00     Pack years: 13.50     Types: Cigarettes     Quit date: 2012     Years since quittin.4   • Smokeless tobacco: Never Used   • Tobacco comment: Smoked on and off over 27 years, quit at times up to a year.   Substance and Sexual Activity   • Alcohol use: Yes     Types: 1 Glasses of wine per week     Comment: Usually do not drink weekly, maybe sevral drinks per month   • Drug use: No   • Sexual activity: Yes     Partners: Male     Birth control/protection: None      The following portions of the patient's history were reviewed and updated as appropriate: She  has a past medical history of Abdominal pain (2013), Acute bronchitis (2015), Acute maxillary sinusitis (10/04/2012), Allergic (), Allergic rhinitis (2015), Depression (2016), Drug therapy (2014), Drug therapy (2016), Dyslipidemia (2013), Encounters for administrative purpose (2015), Generalized anxiety disorder (2016), Hyperlipidemia (2015), Insomnia (2016), Irritable bowel syndrome (), Kidney stone (), Low back pain (2015), Migraine (2015), Osteoarthritis (2016), Pneumonia (), Right lumbar radiculopathy (3/15/2016), and Urinary tract infectious disease (2016)..    Review of Systems   Constitutional: Negative.    HENT: Negative.   Negative for congestion.    Eyes: Negative.  Negative for blurred vision, double vision, photophobia and visual disturbance.   Respiratory: Negative.  Negative for cough, shortness of breath, wheezing and stridor.    Cardiovascular: Negative.  Negative for chest pain, palpitations and leg swelling.   Gastrointestinal: Negative.  Negative for abdominal distention, abdominal pain, blood in stool, constipation, diarrhea, nausea, vomiting, GERD and indigestion.   Endocrine: Negative.  Negative for cold intolerance and heat intolerance.   Genitourinary: Negative.  Negative for dysuria, flank pain, frequency and urinary incontinence.   Musculoskeletal: Negative.  Negative for arthralgias and back pain.   Skin: Negative.    Allergic/Immunologic: Negative.  Negative for immunocompromised state.   Neurological: Positive for headache.   Hematological: Negative.    Psychiatric/Behavioral: Negative.  Negative for agitation, behavioral problems, decreased concentration, dysphoric mood, hallucinations, self-injury, sleep disturbance, suicidal ideas, negative for hyperactivity, depressed mood and stress. The patient is not nervous/anxious.    All other systems reviewed and are negative.    PHQ-9 Depression Screening  Little interest or pleasure in doing things? 0   Feeling down, depressed, or hopeless? 0   Trouble falling or staying asleep, or sleeping too much?     Feeling tired or having little energy?     Poor appetite or overeating?     Feeling bad about yourself - or that you are a failure or have let yourself or your family down?     Trouble concentrating on things, such as reading the newspaper or watching television?     Moving or speaking so slowly that other people could have noticed? Or the opposite - being so fidgety or restless that you have been moving around a lot more than usual?     Thoughts that you would be better off dead, or of hurting yourself in some way?     PHQ-9 Total Score 0   If you checked off any  "problems, how difficult have these problems made it for you to do your work, take care of things at home, or get along with other people?        Objective    Vitals:    05/13/21 1455   BP: 122/70   BP Location: Left arm   Patient Position: Sitting   Cuff Size: Adult   Pulse: 80   Resp: 16   SpO2: 98%   Weight: 94.3 kg (207 lb 14.4 oz)   Height: 167.6 cm (66\")   PainSc: 0-No pain       Physical Exam  Vitals and nursing note reviewed.   Constitutional:       General: She is not in acute distress.     Appearance: Normal appearance. She is well-developed. She is obese. She is not ill-appearing or diaphoretic.   HENT:      Head: Normocephalic and atraumatic.   Eyes:      General: No scleral icterus.        Right eye: No discharge.         Left eye: No discharge.      Conjunctiva/sclera: Conjunctivae normal.      Pupils: Pupils are equal, round, and reactive to light.   Neck:      Thyroid: No thyromegaly.      Vascular: No carotid bruit or JVD.      Trachea: No tracheal deviation.   Cardiovascular:      Rate and Rhythm: Normal rate and regular rhythm.      Pulses: Normal pulses.      Heart sounds: Normal heart sounds. No murmur heard.   No friction rub. No gallop.    Pulmonary:      Effort: Pulmonary effort is normal. No respiratory distress.      Breath sounds: Normal breath sounds. No stridor. No wheezing, rhonchi or rales.   Chest:      Chest wall: No tenderness.   Abdominal:      General: Bowel sounds are normal.      Palpations: Abdomen is soft.   Musculoskeletal:         General: No tenderness or deformity. Normal range of motion.      Cervical back: Normal range of motion and neck supple. No rigidity or tenderness.      Right lower leg: No edema.      Left lower leg: No edema.   Lymphadenopathy:      Cervical: No cervical adenopathy.   Skin:     General: Skin is warm and dry.      Capillary Refill: Capillary refill takes 2 to 3 seconds.      Coloration: Skin is not jaundiced or pale.      Findings: No bruising, " erythema, lesion or rash.   Neurological:      General: No focal deficit present.      Mental Status: She is alert and oriented to person, place, and time. Mental status is at baseline.      Motor: No weakness.      Gait: Gait normal.   Psychiatric:         Mood and Affect: Mood normal.         Behavior: Behavior normal.         Thought Content: Thought content normal.         Judgment: Judgment normal.     Stable migraine syndrome refill Maxalt today.  Stable hyperlipidemia refill atorvastatin today.  Stable vitamin D deficiency refill vitamin D3 today.  Stable primary insomnia and anxiety managed by behavioral health provider.  Repeat labs 1 to 2 days prior to next visit.    Assessment/Plan   Diagnoses and all orders for this visit:    1. Low serum vitamin B12 (Primary)  -     Vitamin B12 & Folate; Future    2. Anxiety    3. Chronic migraine without aura without status migrainosus, not intractable  -     rizatriptan (MAXALT) 10 MG tablet; Take 1 tablet by mouth 1 (One) Time As Needed for Migraine. May repeat in 2 hours if needed  Dispense: 9 tablet; Refill: 5    4. Mixed hyperlipidemia  -     atorvastatin (LIPITOR) 80 MG tablet; Take 1 tablet by mouth Daily. For cholesterol.  Dispense: 90 tablet; Refill: 1  -     Lipid Panel; Future    5. Primary insomnia    6. Vitamin D deficiency  -     Cholecalciferol (Vitamin D3) 1.25 MG (40824 UT) capsule; Take 1 capsule by mouth Every 7 (Seven) Days.  Dispense: 12 capsule; Refill: 1  -     Vitamin D 25 Hydroxy; Future    7. Hyperlipidemia, unspecified hyperlipidemia type    8. Hyperglycemia  -     Comprehensive Metabolic Panel; Future    9. Encounter for long-term current use of medication  -     CBC & Differential; Future  -     Comprehensive Metabolic Panel; Future        Return in about 6 months (around 11/13/2021), or if symptoms worsen or fail to improve.  There are no Patient Instructions on file for this visit.             This document has been electronically signed  by Jeannie Werner, APRN on May 13, 2021 15:47 CDT

## 2021-05-13 NOTE — PROGRESS NOTES
Answers for HPI/ROS submitted by the patient on 5/6/2021  Please describe your symptoms.: N/A  Have you had these symptoms before?: No  How long have you been having these symptoms?: 1-4 days  Please list any medications you are currently taking for this condition.: N/A  Please describe any probable cause for these symptoms. : N/A  What is the primary reason for your visit?: Other

## 2021-06-11 ENCOUNTER — OFFICE VISIT (OUTPATIENT)
Dept: FAMILY MEDICINE CLINIC | Facility: CLINIC | Age: 56
End: 2021-06-11

## 2021-06-11 VITALS
HEIGHT: 66 IN | BODY MASS INDEX: 33.46 KG/M2 | SYSTOLIC BLOOD PRESSURE: 122 MMHG | RESPIRATION RATE: 16 BRPM | WEIGHT: 208.2 LBS | OXYGEN SATURATION: 95 % | TEMPERATURE: 97.8 F | HEART RATE: 93 BPM | DIASTOLIC BLOOD PRESSURE: 76 MMHG

## 2021-06-11 DIAGNOSIS — L08.9 SUPERFICIAL BACTERIAL SKIN INFECTION: ICD-10-CM

## 2021-06-11 DIAGNOSIS — B96.89 SUPERFICIAL BACTERIAL SKIN INFECTION: ICD-10-CM

## 2021-06-11 DIAGNOSIS — R21 RASH AND OTHER NONSPECIFIC SKIN ERUPTION: Primary | ICD-10-CM

## 2021-06-11 PROCEDURE — 96372 THER/PROPH/DIAG INJ SC/IM: CPT | Performed by: NURSE PRACTITIONER

## 2021-06-11 PROCEDURE — 99213 OFFICE O/P EST LOW 20 MIN: CPT | Performed by: NURSE PRACTITIONER

## 2021-06-11 RX ORDER — TRIAMCINOLONE ACETONIDE 40 MG/ML
80 INJECTION, SUSPENSION INTRA-ARTICULAR; INTRAMUSCULAR ONCE
Status: COMPLETED | OUTPATIENT
Start: 2021-06-11 | End: 2021-06-11

## 2021-06-11 RX ORDER — PREDNISONE 20 MG/1
20 TABLET ORAL 2 TIMES DAILY
Qty: 20 TABLET | Refills: 0 | Status: SHIPPED | OUTPATIENT
Start: 2021-06-11 | End: 2021-06-21

## 2021-06-11 RX ORDER — CLOTRIMAZOLE AND BETAMETHASONE DIPROPIONATE 10; .64 MG/G; MG/G
CREAM TOPICAL 2 TIMES DAILY
Qty: 45 G | Refills: 1 | Status: SHIPPED | OUTPATIENT
Start: 2021-06-11 | End: 2023-02-17

## 2021-06-11 RX ORDER — SULFAMETHOXAZOLE AND TRIMETHOPRIM 800; 160 MG/1; MG/1
1 TABLET ORAL 2 TIMES DAILY
Qty: 14 TABLET | Refills: 0 | Status: SHIPPED | OUTPATIENT
Start: 2021-06-11 | End: 2021-06-18

## 2021-06-11 RX ADMIN — TRIAMCINOLONE ACETONIDE 80 MG: 40 INJECTION, SUSPENSION INTRA-ARTICULAR; INTRAMUSCULAR at 10:25

## 2021-06-11 NOTE — PROGRESS NOTES
Subjective   Blaire Sullivan is a 55 y.o. female.       Chief Complaint   Patient presents with   • Rash     Rash on hands        History of Present Illness   Patient reports about 10-day history of itchy burning rash along the palmar and medial sides of her index fingers bilaterally and across the knuckle of a few fingers.  Reports she has tried bag balm she has tried over-the-counter medications topical, emollients to no effect.  This is very bothersome.  Reports she has had this once before years ago under the care of Dr. Mullins who told her that it was atopic dermatitis.  She reports she has not had her hands in any chemicals nor has she been doing any yard work or touching any plants, and has had no change in detergents or body care products.  There is no rash anywhere else on the body.    Other complaints today: none.    Parts of most recent relevant visit HPI, ROS  and PE may be carried forward and all are updated as appropriate for current situation.    Past Surgical History:   Procedure Laterality Date   • CARPAL TUNNEL RELEASE Bilateral    • COLONOSCOPY  2013   • HYSTERECTOMY     • INJECTION OF MEDICATION  2015    Dexamethasone (1)      • INJECTION OF MEDICATION  2015    Rocephin (1)   • JOINT REPLACEMENT Bilateral     hips   • LAPAROTOMY EXPLORATORY FOR ECTOPIC PREGNANCY     • SPINE SURGERY     • SUBTOTAL HYSTERECTOMY  2011   • TUBAL ABDOMINAL LIGATION        Social History     Socioeconomic History   • Marital status:      Spouse name: Biju   • Number of children: 0   • Years of education: Not on file   • Highest education level: Not on file   Tobacco Use   • Smoking status: Former Smoker     Packs/day: 0.50     Years: 27.00     Pack years: 13.50     Types: Cigarettes     Quit date: 2012     Years since quittin.5   • Smokeless tobacco: Never Used   • Tobacco comment: Smoked on and off over 27 years, quit at times up to a year.   Substance and Sexual Activity   •  Alcohol use: Yes     Types: 1 Glasses of wine per week     Comment: Usually do not drink weekly, maybe sevral drinks per month   • Drug use: No   • Sexual activity: Yes     Partners: Male     Birth control/protection: None      The following portions of the patient's history were reviewed and updated as appropriate: She  has a past medical history of Abdominal pain (07/22/2013), Acute bronchitis (05/21/2015), Acute maxillary sinusitis (10/04/2012), Allergic (2001), Allergic rhinitis (02/20/2015), Depression (02/17/2016), Drug therapy (02/24/2014), Drug therapy (05/11/2016), Dyslipidemia (08/29/2013), Encounters for administrative purpose (07/14/2015), Generalized anxiety disorder (05/11/2016), Hyperlipidemia (04/14/2015), Insomnia (05/11/2016), Irritable bowel syndrome (2013), Kidney stone (2001), Low back pain (06/18/2015), Migraine (04/14/2015), Osteoarthritis (05/11/2016), Pneumonia (1978), Right lumbar radiculopathy (3/15/2016), and Urinary tract infectious disease (06/22/2016)..    Review of Systems   Constitutional: Negative.    HENT: Negative.  Negative for congestion.    Eyes: Negative.  Negative for blurred vision, double vision, photophobia and visual disturbance.   Respiratory: Negative.  Negative for cough, shortness of breath, wheezing and stridor.    Cardiovascular: Negative.  Negative for chest pain, palpitations and leg swelling.   Gastrointestinal: Negative.  Negative for abdominal distention, abdominal pain, blood in stool, constipation, diarrhea, nausea, vomiting, GERD and indigestion.   Endocrine: Negative.  Negative for cold intolerance and heat intolerance.   Genitourinary: Negative.  Negative for dysuria, flank pain, frequency and urinary incontinence.   Musculoskeletal: Negative.  Negative for arthralgias and back pain.   Skin: Positive for rash (both hands).   Allergic/Immunologic: Negative.  Negative for immunocompromised state.   Neurological: Positive for headache.   Hematological:  "Negative.    Psychiatric/Behavioral: Negative.  Negative for agitation, behavioral problems, decreased concentration, dysphoric mood, hallucinations, self-injury, sleep disturbance, suicidal ideas, negative for hyperactivity, depressed mood and stress. The patient is not nervous/anxious.    All other systems reviewed and are negative.    PHQ-9 Depression Screening  Little interest or pleasure in doing things?     Feeling down, depressed, or hopeless?     Trouble falling or staying asleep, or sleeping too much?     Feeling tired or having little energy?     Poor appetite or overeating?     Feeling bad about yourself - or that you are a failure or have let yourself or your family down?     Trouble concentrating on things, such as reading the newspaper or watching television?     Moving or speaking so slowly that other people could have noticed? Or the opposite - being so fidgety or restless that you have been moving around a lot more than usual?     Thoughts that you would be better off dead, or of hurting yourself in some way?     PHQ-9 Total Score     If you checked off any problems, how difficult have these problems made it for you to do your work, take care of things at home, or get along with other people?        Objective    Vitals:    06/11/21 0835   BP: 122/76   BP Location: Left arm   Patient Position: Sitting   Pulse: 93   Resp: 16   Temp: 97.8 °F (36.6 °C)   SpO2: 95%   Weight: 94.4 kg (208 lb 3.2 oz)   Height: 167.6 cm (66\")   PainSc: 0-No pain       Physical Exam  Vitals and nursing note reviewed.   Constitutional:       General: She is not in acute distress.     Appearance: Normal appearance. She is well-developed. She is obese. She is not ill-appearing or diaphoretic.   HENT:      Head: Normocephalic and atraumatic.   Eyes:      General: No scleral icterus.        Right eye: No discharge.         Left eye: No discharge.      Conjunctiva/sclera: Conjunctivae normal.      Pupils: Pupils are equal, round, " and reactive to light.   Neck:      Thyroid: No thyromegaly.      Vascular: No carotid bruit or JVD.      Trachea: No tracheal deviation.   Cardiovascular:      Rate and Rhythm: Normal rate and regular rhythm.      Pulses: Normal pulses.      Heart sounds: Normal heart sounds. No murmur heard.   No friction rub. No gallop.    Pulmonary:      Effort: Pulmonary effort is normal. No respiratory distress.      Breath sounds: Normal breath sounds. No stridor. No wheezing, rhonchi or rales.   Chest:      Chest wall: No tenderness.   Abdominal:      General: Bowel sounds are normal.      Palpations: Abdomen is soft.   Musculoskeletal:         General: No tenderness or deformity. Normal range of motion.      Cervical back: Normal range of motion and neck supple. No rigidity or tenderness.      Right lower leg: No edema.      Left lower leg: No edema.   Lymphadenopathy:      Cervical: No cervical adenopathy.   Skin:     General: Skin is warm and dry.      Capillary Refill: Capillary refill takes 2 to 3 seconds.      Coloration: Skin is not jaundiced or pale.      Findings: Rash (on both hands) present. No bruising, erythema or lesion.      Comments: Macular rash on medial and palmar surface of both index fingers, several 1st and 2nd DIP joints bilaterally. Rash is moist, slightly raised, confluent with some small papules palpable along several areas. Index fingers are slightly edematous and tender.    Neurological:      General: No focal deficit present.      Mental Status: She is alert and oriented to person, place, and time. Mental status is at baseline.      Motor: No weakness.      Gait: Gait normal.   Psychiatric:         Mood and Affect: Mood normal.         Behavior: Behavior normal.         Thought Content: Thought content normal.         Judgment: Judgment normal.         Assessment/Plan   Diagnoses and all orders for this visit:    1. Rash and other nonspecific skin eruption (Primary)  -     triamcinolone acetonide  (KENALOG-40) injection 80 mg  -     predniSONE (DELTASONE) 20 MG tablet; Take 1 tablet by mouth 2 (Two) Times a Day for 10 days.  Dispense: 20 tablet; Refill: 0  -     clotrimazole-betamethasone (Lotrisone) 1-0.05 % cream; Apply  topically to the appropriate area as directed 2 (Two) Times a Day. To affected areas both hands until clear, repeat for recurrence.  Dispense: 45 g; Refill: 1    2. Superficial bacterial skin infection  -     sulfamethoxazole-trimethoprim (BACTRIM DS,SEPTRA DS) 800-160 MG per tablet; Take 1 tablet by mouth 2 (Two) Times a Day for 7 days.  Dispense: 14 tablet; Refill: 0        Return in about 1 week (around 6/18/2021), or if symptoms worsen or fail to improve.             This document has been electronically signed by ALBERTO Glez on June 11, 2021 09:46 CDT

## 2021-09-14 DIAGNOSIS — G43.709 CHRONIC MIGRAINE WITHOUT AURA WITHOUT STATUS MIGRAINOSUS, NOT INTRACTABLE: ICD-10-CM

## 2021-09-14 RX ORDER — RIZATRIPTAN BENZOATE 10 MG/1
TABLET ORAL
Qty: 9 TABLET | Refills: 0 | Status: SHIPPED | OUTPATIENT
Start: 2021-09-14 | End: 2021-10-05

## 2021-09-20 ENCOUNTER — OFFICE VISIT (OUTPATIENT)
Dept: FAMILY MEDICINE CLINIC | Facility: CLINIC | Age: 56
End: 2021-09-20

## 2021-09-20 VITALS
HEART RATE: 98 BPM | BODY MASS INDEX: 32.83 KG/M2 | HEIGHT: 66 IN | OXYGEN SATURATION: 98 % | TEMPERATURE: 98.7 F | SYSTOLIC BLOOD PRESSURE: 118 MMHG | WEIGHT: 204.3 LBS | DIASTOLIC BLOOD PRESSURE: 74 MMHG | RESPIRATION RATE: 16 BRPM

## 2021-09-20 DIAGNOSIS — R06.02 SOB (SHORTNESS OF BREATH): Primary | ICD-10-CM

## 2021-09-20 PROCEDURE — 99214 OFFICE O/P EST MOD 30 MIN: CPT | Performed by: NURSE PRACTITIONER

## 2021-09-20 RX ORDER — ALBUTEROL SULFATE 90 UG/1
2 AEROSOL, METERED RESPIRATORY (INHALATION) EVERY 4 HOURS PRN
Qty: 18 G | Refills: 2 | Status: CANCELLED | OUTPATIENT
Start: 2021-09-20

## 2021-09-20 NOTE — PROGRESS NOTES
"Subjective   Blaire Antonio Sullivan is a 55 y.o. female.       Chief Complaint   Patient presents with   • Shortness of Breath     O2 check        History of Present Illness     Patient presents for complaints of having a witnessed oxygen desaturation during conscious sedation for oral surgery on 21, during which her sat was noted to fall to 88%, preventing a full \"sleep\" anesthesia.  Her oral surgeon advised her to see her PCP about this.Today in office, her 02 sat 98% on room air sitting. Drops to 95% when lying supine. She has not experienced any shortness of breath, no wheezing, no nocturnal dyspnea, and does not feel sick at all.  Her exercise tolerance has not changed. She is a former smoker, having quit many years ago, and has no history of asthma.   She has no known history of sleep apnea or obstruction.     No other complaints toiday.    Parts of most recent relevant visit HPI, ROS  and PE may be carried forward and all are updated as appropriate for current situation.      Past Surgical History:   Procedure Laterality Date   • CARPAL TUNNEL RELEASE Bilateral    • COLONOSCOPY  2013   • HYSTERECTOMY     • INJECTION OF MEDICATION  2015    Dexamethasone (1)      • INJECTION OF MEDICATION  2015    Rocephin (1)   • JOINT REPLACEMENT Bilateral     hips   • LAPAROTOMY EXPLORATORY FOR ECTOPIC PREGNANCY     • MOUTH SURGERY  2021   • SPINE SURGERY     • SUBTOTAL HYSTERECTOMY  2011   • TUBAL ABDOMINAL LIGATION        Social History     Socioeconomic History   • Marital status:      Spouse name: Biju   • Number of children: 0   • Years of education: Not on file   • Highest education level: Not on file   Tobacco Use   • Smoking status: Former Smoker     Packs/day: 0.50     Years: 27.00     Pack years: 13.50     Types: Cigarettes     Quit date: 2012     Years since quittin.8   • Smokeless tobacco: Never Used   • Tobacco comment: Smoked on and off over 27 years, quit at times up " to a year.   Substance and Sexual Activity   • Alcohol use: Yes     Types: 1 Glasses of wine per week     Comment: Usually do not drink weekly, maybe sevral drinks per month   • Drug use: No   • Sexual activity: Yes     Partners: Male     Birth control/protection: None      The following portions of the patient's history were reviewed and updated as appropriate: She  has a past medical history of Abdominal pain (07/22/2013), Acute bronchitis (05/21/2015), Acute maxillary sinusitis (10/04/2012), Allergic (2001), Allergic rhinitis (02/20/2015), Depression (02/17/2016), Drug therapy (02/24/2014), Drug therapy (05/11/2016), Dyslipidemia (08/29/2013), Encounters for administrative purpose (07/14/2015), Generalized anxiety disorder (05/11/2016), Hyperlipidemia (04/14/2015), Insomnia (05/11/2016), Irritable bowel syndrome (2013), Kidney stone (2001), Low back pain (06/18/2015), Migraine (04/14/2015), Osteoarthritis (05/11/2016), Pneumonia (1978), Right lumbar radiculopathy (3/15/2016), and Urinary tract infectious disease (06/22/2016)..    Review of Systems   Constitutional: Negative.    HENT: Negative.  Negative for congestion.    Eyes: Negative.  Negative for blurred vision, double vision, photophobia and visual disturbance.   Respiratory: Negative.  Negative for cough, shortness of breath, wheezing and stridor.    Cardiovascular: Negative.  Negative for chest pain, palpitations and leg swelling.   Gastrointestinal: Negative.  Negative for abdominal distention, abdominal pain, blood in stool, constipation, diarrhea, nausea, vomiting, GERD and indigestion.   Endocrine: Negative.  Negative for cold intolerance and heat intolerance.   Genitourinary: Negative.  Negative for dysuria, flank pain, frequency and urinary incontinence.   Musculoskeletal: Negative.  Negative for arthralgias and back pain.   Skin: Negative.    Allergic/Immunologic: Negative.  Negative for immunocompromised state.   Neurological: Positive for  "headache.   Hematological: Negative.    Psychiatric/Behavioral: Negative.  Negative for agitation, behavioral problems, decreased concentration, dysphoric mood, hallucinations, self-injury, sleep disturbance, suicidal ideas, negative for hyperactivity, depressed mood and stress. The patient is not nervous/anxious.    All other systems reviewed and are negative.    PHQ-9 Depression Screening  Little interest or pleasure in doing things?     Feeling down, depressed, or hopeless?     Trouble falling or staying asleep, or sleeping too much?     Feeling tired or having little energy?     Poor appetite or overeating?     Feeling bad about yourself - or that you are a failure or have let yourself or your family down?     Trouble concentrating on things, such as reading the newspaper or watching television?     Moving or speaking so slowly that other people could have noticed? Or the opposite - being so fidgety or restless that you have been moving around a lot more than usual?     Thoughts that you would be better off dead, or of hurting yourself in some way?     PHQ-9 Total Score     If you checked off any problems, how difficult have these problems made it for you to do your work, take care of things at home, or get along with other people?        Objective    Vitals:    09/20/21 0907   BP: 118/74   BP Location: Left arm   Patient Position: Sitting   Cuff Size: Adult   Pulse: 98   Resp: 16   Temp: 98.7 °F (37.1 °C)   SpO2: 98%  Comment: sitting 95 laying down   Weight: 92.7 kg (204 lb 4.8 oz)   Height: 167.6 cm (66\")   PainSc: 0-No pain       Physical Exam  Vitals and nursing note reviewed.   Constitutional:       General: She is not in acute distress.     Appearance: Normal appearance. She is well-developed. She is obese. She is not ill-appearing or diaphoretic.   HENT:      Head: Normocephalic and atraumatic.   Eyes:      General: No scleral icterus.        Right eye: No discharge.         Left eye: No discharge.      " Conjunctiva/sclera: Conjunctivae normal.      Pupils: Pupils are equal, round, and reactive to light.   Neck:      Thyroid: No thyromegaly.      Vascular: No carotid bruit or JVD.      Trachea: No tracheal deviation.   Cardiovascular:      Rate and Rhythm: Normal rate and regular rhythm.      Pulses: Normal pulses.      Heart sounds: Normal heart sounds. No murmur heard.   No friction rub. No gallop.    Pulmonary:      Effort: Pulmonary effort is normal. No respiratory distress.      Breath sounds: Normal breath sounds. No stridor. No wheezing, rhonchi or rales.   Chest:      Chest wall: No tenderness.   Abdominal:      General: Bowel sounds are normal.      Palpations: Abdomen is soft.   Musculoskeletal:         General: No tenderness or deformity. Normal range of motion.      Cervical back: Normal range of motion and neck supple. No rigidity or tenderness.      Right lower leg: No edema.      Left lower leg: No edema.   Lymphadenopathy:      Cervical: No cervical adenopathy.   Skin:     General: Skin is warm and dry.      Capillary Refill: Capillary refill takes 2 to 3 seconds.      Coloration: Skin is not jaundiced or pale.      Findings: No bruising, erythema, lesion or rash.   Neurological:      General: No focal deficit present.      Mental Status: She is alert and oriented to person, place, and time. Mental status is at baseline.      Motor: No weakness.      Gait: Gait normal.   Psychiatric:         Mood and Affect: Mood normal.         Behavior: Behavior normal.         Thought Content: Thought content normal.         Judgment: Judgment normal.     witnessed sleep related oxygen desaturation. Over night sleep study/ home  Is ordered through [facility] today.     Assessment/Plan   Diagnoses and all orders for this visit:    1. SOB (shortness of breath) (Primary)  -     XR Chest 2 View    Other orders  -     Cancel: Ambulatory Referral to Pulmonology  -     Cancel: albuterol sulfate  (90 Base) MCG/ACT  inhaler; Inhale 2 puffs Every 4 (Four) Hours As Needed for Shortness of Air.  Dispense: 18 g; Refill: 2        Return in about 4 weeks (around 10/18/2021) for after study complete.             This document has been electronically signed by ALBERTO Glez on September 20, 2021 09:46 CDT

## 2021-10-01 ENCOUNTER — TELEPHONE (OUTPATIENT)
Dept: FAMILY MEDICINE CLINIC | Facility: CLINIC | Age: 56
End: 2021-10-01

## 2021-10-05 DIAGNOSIS — G43.709 CHRONIC MIGRAINE WITHOUT AURA WITHOUT STATUS MIGRAINOSUS, NOT INTRACTABLE: ICD-10-CM

## 2021-10-05 RX ORDER — RIZATRIPTAN BENZOATE 10 MG/1
TABLET ORAL
Qty: 9 TABLET | Refills: 5 | Status: SHIPPED | OUTPATIENT
Start: 2021-10-05 | End: 2022-02-14

## 2021-11-09 ENCOUNTER — LAB (OUTPATIENT)
Dept: LAB | Facility: OTHER | Age: 56
End: 2021-11-09

## 2021-11-09 DIAGNOSIS — E55.9 VITAMIN D DEFICIENCY: ICD-10-CM

## 2021-11-09 DIAGNOSIS — E53.8 LOW SERUM VITAMIN B12: ICD-10-CM

## 2021-11-09 DIAGNOSIS — R73.9 HYPERGLYCEMIA: ICD-10-CM

## 2021-11-09 DIAGNOSIS — E78.2 MIXED HYPERLIPIDEMIA: ICD-10-CM

## 2021-11-09 DIAGNOSIS — Z79.899 ENCOUNTER FOR LONG-TERM CURRENT USE OF MEDICATION: ICD-10-CM

## 2021-11-09 LAB
ALBUMIN SERPL-MCNC: 4.7 G/DL (ref 3.5–5)
ALBUMIN/GLOB SERPL: 1.5 G/DL (ref 1.1–1.8)
ALP SERPL-CCNC: 83 U/L (ref 38–126)
ALT SERPL W P-5'-P-CCNC: 36 U/L
ANION GAP SERPL CALCULATED.3IONS-SCNC: 8 MMOL/L (ref 5–15)
AST SERPL-CCNC: 28 U/L (ref 14–36)
BASOPHILS # BLD AUTO: 0.07 10*3/MM3 (ref 0–0.2)
BASOPHILS NFR BLD AUTO: 1 % (ref 0–1.5)
BILIRUB SERPL-MCNC: 0.3 MG/DL (ref 0.2–1.3)
BUN SERPL-MCNC: 19 MG/DL (ref 7–23)
BUN/CREAT SERPL: 22.1 (ref 7–25)
CALCIUM SPEC-SCNC: 9.8 MG/DL (ref 8.4–10.2)
CHLORIDE SERPL-SCNC: 104 MMOL/L (ref 101–112)
CHOLEST SERPL-MCNC: 172 MG/DL (ref 150–200)
CO2 SERPL-SCNC: 27 MMOL/L (ref 22–30)
CREAT SERPL-MCNC: 0.86 MG/DL (ref 0.52–1.04)
DEPRECATED RDW RBC AUTO: 45 FL (ref 37–54)
EOSINOPHIL # BLD AUTO: 0.29 10*3/MM3 (ref 0–0.4)
EOSINOPHIL NFR BLD AUTO: 4 % (ref 0.3–6.2)
ERYTHROCYTE [DISTWIDTH] IN BLOOD BY AUTOMATED COUNT: 13.9 % (ref 12.3–15.4)
GFR SERPL CREATININE-BSD FRML MDRD: 69 ML/MIN/1.73 (ref 51–120)
GLOBULIN UR ELPH-MCNC: 3.2 GM/DL (ref 2.3–3.5)
GLUCOSE SERPL-MCNC: 103 MG/DL (ref 70–99)
HCT VFR BLD AUTO: 40.6 % (ref 34–46.6)
HDLC SERPL-MCNC: 55 MG/DL (ref 40–59)
HGB BLD-MCNC: 13 G/DL (ref 12–15.9)
LDLC SERPL CALC-MCNC: 94 MG/DL
LDLC/HDLC SERPL: 1.65 {RATIO} (ref 0–3.22)
LYMPHOCYTES # BLD AUTO: 2.62 10*3/MM3 (ref 0.7–3.1)
LYMPHOCYTES NFR BLD AUTO: 36.3 % (ref 19.6–45.3)
MCH RBC QN AUTO: 28.8 PG (ref 26.6–33)
MCHC RBC AUTO-ENTMCNC: 32 G/DL (ref 31.5–35.7)
MCV RBC AUTO: 89.8 FL (ref 79–97)
MONOCYTES # BLD AUTO: 0.57 10*3/MM3 (ref 0.1–0.9)
MONOCYTES NFR BLD AUTO: 7.9 % (ref 5–12)
NEUTROPHILS NFR BLD AUTO: 3.66 10*3/MM3 (ref 1.7–7)
NEUTROPHILS NFR BLD AUTO: 50.8 % (ref 42.7–76)
PLATELET # BLD AUTO: 348 10*3/MM3 (ref 140–450)
PMV BLD AUTO: 9.7 FL (ref 6–12)
POTASSIUM SERPL-SCNC: 4.4 MMOL/L (ref 3.4–5)
PROT SERPL-MCNC: 7.9 G/DL (ref 6.3–8.6)
RBC # BLD AUTO: 4.52 10*6/MM3 (ref 3.77–5.28)
SODIUM SERPL-SCNC: 139 MMOL/L (ref 137–145)
TRIGL SERPL-MCNC: 131 MG/DL
VLDLC SERPL-MCNC: 23 MG/DL (ref 5–40)
WBC # BLD AUTO: 7.21 10*3/MM3 (ref 3.4–10.8)

## 2021-11-09 PROCEDURE — 80053 COMPREHEN METABOLIC PANEL: CPT | Performed by: NURSE PRACTITIONER

## 2021-11-09 PROCEDURE — 85025 COMPLETE CBC W/AUTO DIFF WBC: CPT | Performed by: NURSE PRACTITIONER

## 2021-11-09 PROCEDURE — 82746 ASSAY OF FOLIC ACID SERUM: CPT | Performed by: NURSE PRACTITIONER

## 2021-11-09 PROCEDURE — 82306 VITAMIN D 25 HYDROXY: CPT | Performed by: NURSE PRACTITIONER

## 2021-11-09 PROCEDURE — 82607 VITAMIN B-12: CPT | Performed by: NURSE PRACTITIONER

## 2021-11-09 PROCEDURE — 80061 LIPID PANEL: CPT | Performed by: NURSE PRACTITIONER

## 2021-11-09 PROCEDURE — 36415 COLL VENOUS BLD VENIPUNCTURE: CPT | Performed by: NURSE PRACTITIONER

## 2021-11-09 RX ORDER — CHOLECALCIFEROL (VITAMIN D3) 1250 MCG
CAPSULE ORAL
Qty: 12 CAPSULE | Refills: 1 | Status: SHIPPED | OUTPATIENT
Start: 2021-11-09 | End: 2022-05-02

## 2021-11-10 LAB
25(OH)D3 SERPL-MCNC: 72.2 NG/ML (ref 30–100)
FOLATE SERPL-MCNC: >20 NG/ML (ref 4.78–24.2)
VIT B12 BLD-MCNC: 873 PG/ML (ref 211–946)

## 2021-11-11 DIAGNOSIS — E78.2 MIXED HYPERLIPIDEMIA: ICD-10-CM

## 2021-11-11 RX ORDER — ATORVASTATIN CALCIUM 80 MG/1
TABLET, FILM COATED ORAL
Qty: 90 TABLET | Refills: 1 | Status: SHIPPED | OUTPATIENT
Start: 2021-11-11 | End: 2022-05-19

## 2021-11-12 ENCOUNTER — OFFICE VISIT (OUTPATIENT)
Dept: FAMILY MEDICINE CLINIC | Facility: CLINIC | Age: 56
End: 2021-11-12

## 2021-11-12 VITALS
RESPIRATION RATE: 16 BRPM | HEART RATE: 70 BPM | TEMPERATURE: 98.9 F | BODY MASS INDEX: 33.11 KG/M2 | HEIGHT: 66 IN | OXYGEN SATURATION: 97 % | WEIGHT: 206 LBS | SYSTOLIC BLOOD PRESSURE: 120 MMHG | DIASTOLIC BLOOD PRESSURE: 78 MMHG

## 2021-11-12 DIAGNOSIS — G47.33 OSA ON CPAP: ICD-10-CM

## 2021-11-12 DIAGNOSIS — E55.9 VITAMIN D DEFICIENCY: ICD-10-CM

## 2021-11-12 DIAGNOSIS — G47.34 NOCTURNAL HYPOXIA: ICD-10-CM

## 2021-11-12 DIAGNOSIS — E78.2 MIXED HYPERLIPIDEMIA: ICD-10-CM

## 2021-11-12 DIAGNOSIS — Z99.89 OSA ON CPAP: ICD-10-CM

## 2021-11-12 DIAGNOSIS — Z23 NEED FOR INFLUENZA VACCINATION: Primary | ICD-10-CM

## 2021-11-12 PROCEDURE — 90471 IMMUNIZATION ADMIN: CPT | Performed by: NURSE PRACTITIONER

## 2021-11-12 PROCEDURE — 90686 IIV4 VACC NO PRSV 0.5 ML IM: CPT | Performed by: NURSE PRACTITIONER

## 2021-11-12 PROCEDURE — 99213 OFFICE O/P EST LOW 20 MIN: CPT | Performed by: NURSE PRACTITIONER

## 2021-11-12 NOTE — PROGRESS NOTES
Subjective   Blaire Sullivan is a 56 y.o. female.       Chief Complaint   Patient presents with   • Insomnia     results from sleep study        History of Present Illness   Patient presents for results of sleep study, which showed significant FARHANA and for which a CPAP was prescribed. Patient reports  Has not recevied the CPAP as of this date. However, she has been on oxygen at night for quite a while now. Reports 100% compliance rate with use.  Today reports am headaches have resolved. Has migraine syndrome, but reports has only had one requiring medication in past 2-3 weeks with use of oxygen at night. Sleep study report is not scanned into chart yet. Will call with results when located but I remember it saying she did have FARHANA and there was an attached order for CPAP.    Labs of 21 were reviewed. CBC, CMP, vitamin D, B12 and folate, Lipid, all are normal/ stable.     Other complaint today: none.   Parts of most recent relevant visit HPI, ROS  and PE may be carried forward and all are updated as appropriate for current situation.    Past Surgical History:   Procedure Laterality Date   • CARPAL TUNNEL RELEASE Bilateral    • COLONOSCOPY  2013   • HYSTERECTOMY     • INJECTION OF MEDICATION  2015    Dexamethasone (1)      • INJECTION OF MEDICATION  2015    Rocephin (1)   • JOINT REPLACEMENT Bilateral     hips   • LAPAROTOMY EXPLORATORY FOR ECTOPIC PREGNANCY     • MOUTH SURGERY  2021   • SPINE SURGERY     • SUBTOTAL HYSTERECTOMY  2011   • TUBAL ABDOMINAL LIGATION        Social History     Socioeconomic History   • Marital status:      Spouse name: Biju   • Number of children: 0   Tobacco Use   • Smoking status: Former Smoker     Packs/day: 0.50     Years: 27.00     Pack years: 13.50     Types: Cigarettes     Quit date: 2012     Years since quittin.9   • Smokeless tobacco: Never Used   • Tobacco comment: Smoked on and off over 27 years, quit at times up to a year.    Substance and Sexual Activity   • Alcohol use: Yes     Types: 1 Glasses of wine per week     Comment: Usually do not drink weekly, maybe sevral drinks per month   • Drug use: No   • Sexual activity: Yes     Partners: Male     Birth control/protection: None      The following portions of the patient's history were reviewed and updated as appropriate: allergies, current medications, past family history, past medical history, past social history, past surgical history and problem list.    Review of Systems   Constitutional: Negative.    HENT: Negative.  Negative for congestion.    Eyes: Negative.  Negative for blurred vision, double vision, photophobia and visual disturbance.   Respiratory: Negative.  Negative for cough, shortness of breath, wheezing and stridor.    Cardiovascular: Negative.  Negative for chest pain, palpitations and leg swelling.   Gastrointestinal: Negative.  Negative for abdominal distention, abdominal pain, blood in stool, constipation, diarrhea, nausea, vomiting, GERD and indigestion.   Endocrine: Negative.  Negative for cold intolerance and heat intolerance.   Genitourinary: Negative.  Negative for dysuria, flank pain, frequency and urinary incontinence.   Musculoskeletal: Negative.  Negative for arthralgias and back pain.   Skin: Negative.    Allergic/Immunologic: Negative.  Negative for immunocompromised state.   Neurological: Positive for headache.   Hematological: Negative.    Psychiatric/Behavioral: Negative.  Negative for agitation, behavioral problems, decreased concentration, dysphoric mood, hallucinations, self-injury, sleep disturbance, suicidal ideas, negative for hyperactivity, depressed mood and stress. The patient is not nervous/anxious.    All other systems reviewed and are negative.    PHQ-9 Depression Screening  Little interest or pleasure in doing things?     Feeling down, depressed, or hopeless?     Trouble falling or staying asleep, or sleeping too much?     Feeling tired or  "having little energy?     Poor appetite or overeating?     Feeling bad about yourself - or that you are a failure or have let yourself or your family down?     Trouble concentrating on things, such as reading the newspaper or watching television?     Moving or speaking so slowly that other people could have noticed? Or the opposite - being so fidgety or restless that you have been moving around a lot more than usual?     Thoughts that you would be better off dead, or of hurting yourself in some way?     PHQ-9 Total Score     If you checked off any problems, how difficult have these problems made it for you to do your work, take care of things at home, or get along with other people?        Objective    Vitals:    11/12/21 1516   BP: 120/78   BP Location: Left arm   Patient Position: Sitting   Cuff Size: Adult   Pulse: 70   Resp: 16   Temp: 98.9 °F (37.2 °C)   SpO2: 97%   Weight: 93.4 kg (206 lb)   Height: 167.6 cm (66\")   PainSc: 0-No pain   Body mass index is 33.25 kg/m².        Physical Exam  Vitals and nursing note reviewed.   Constitutional:       General: She is not in acute distress.     Appearance: Normal appearance. She is well-developed. She is obese. She is not ill-appearing or diaphoretic.   HENT:      Head: Normocephalic and atraumatic.   Eyes:      General: No scleral icterus.        Right eye: No discharge.         Left eye: No discharge.      Conjunctiva/sclera: Conjunctivae normal.      Pupils: Pupils are equal, round, and reactive to light.   Neck:      Thyroid: No thyromegaly.      Vascular: No carotid bruit or JVD.      Trachea: No tracheal deviation.   Cardiovascular:      Rate and Rhythm: Normal rate and regular rhythm.      Pulses: Normal pulses.      Heart sounds: Normal heart sounds. No murmur heard.  No friction rub. No gallop.    Pulmonary:      Effort: Pulmonary effort is normal. No respiratory distress.      Breath sounds: Normal breath sounds. No stridor. No wheezing, rhonchi or rales. "   Chest:      Chest wall: No tenderness.   Abdominal:      General: Bowel sounds are normal.      Palpations: Abdomen is soft.   Musculoskeletal:         General: No tenderness or deformity. Normal range of motion.      Cervical back: Normal range of motion and neck supple. No rigidity or tenderness.      Right lower leg: No edema.      Left lower leg: No edema.   Lymphadenopathy:      Cervical: No cervical adenopathy.   Skin:     General: Skin is warm and dry.      Capillary Refill: Capillary refill takes 2 to 3 seconds.      Coloration: Skin is not jaundiced or pale.      Findings: No bruising, erythema, lesion or rash.   Neurological:      General: No focal deficit present.      Mental Status: She is alert and oriented to person, place, and time. Mental status is at baseline.      Motor: No weakness.      Gait: Gait normal.   Psychiatric:         Mood and Affect: Mood normal.         Behavior: Behavior normal.         Thought Content: Thought content normal.         Judgment: Judgment normal.     Answers for HPI/ROS submitted by the patient on 11/10/2021  Please describe your symptoms.: Lab work and sleep study results  Have you had these symptoms before?: Yes  How long have you been having these symptoms?: Greater than 2 weeks  What is the primary reason for your visit?: Other    Improvement in symptoms of nocturnal hypoxia, marked reduction in am headaches with 100% compliant use of night time oxygen reported. Reports sleeping better and feels more rested in ams.  Sleep study report pending post to chart. CPAP was ordered, has not received yet.  Labs stable, at goal, stable hyperlipidemia, vitamin D deficiency    Assessment/Plan   Diagnoses and all orders for this visit:    1. Need for influenza vaccination (Primary)  -     FluLaval/Fluarix/Fluzone >6 Months    2. FARHANA on CPAP    3. Mixed hyperlipidemia    4. Vitamin D deficiency    5. Nocturnal hypoxia      Return in about 6 months (around 5/12/2022), or if  symptoms worsen or fail to improve.               This document has been electronically signed by ALBERTO Glez on November 12, 2021 15:50 CST

## 2021-11-29 ENCOUNTER — LAB (OUTPATIENT)
Dept: LAB | Facility: OTHER | Age: 56
End: 2021-11-29

## 2021-11-29 PROCEDURE — 87635 SARS-COV-2 COVID-19 AMP PRB: CPT | Performed by: PHYSICIAN ASSISTANT

## 2021-11-30 ENCOUNTER — HOSPITAL ENCOUNTER (EMERGENCY)
Facility: HOSPITAL | Age: 56
Discharge: HOME | End: 2021-11-30

## 2021-11-30 VITALS
DIASTOLIC BLOOD PRESSURE: 71 MMHG | HEART RATE: 99 BPM | OXYGEN SATURATION: 97 % | TEMPERATURE: 97.7 F | SYSTOLIC BLOOD PRESSURE: 137 MMHG | RESPIRATION RATE: 18 BRPM

## 2021-11-30 PROCEDURE — 25010000002 INJECTION, CASIRIVIMAB AND IMDEVIMAB, 1200 MG: Performed by: PHYSICIAN ASSISTANT

## 2021-11-30 PROCEDURE — M0243 CASIRIVI AND IMDEVI INFUSION: HCPCS | Performed by: PHYSICIAN ASSISTANT

## 2021-11-30 PROCEDURE — 99202 OFFICE O/P NEW SF 15 MIN: CPT

## 2021-11-30 RX ORDER — METHYLPREDNISOLONE SODIUM SUCCINATE 125 MG/2ML
125 INJECTION, POWDER, LYOPHILIZED, FOR SOLUTION INTRAMUSCULAR; INTRAVENOUS ONCE AS NEEDED
Status: DISCONTINUED | OUTPATIENT
Start: 2021-11-30 | End: 2021-11-30 | Stop reason: HOSPADM

## 2021-11-30 RX ORDER — EPINEPHRINE 1 MG/ML
0.3 INJECTION, SOLUTION INTRAMUSCULAR; SUBCUTANEOUS ONCE AS NEEDED
Status: DISCONTINUED | OUTPATIENT
Start: 2021-11-30 | End: 2021-11-30 | Stop reason: HOSPADM

## 2021-11-30 RX ORDER — DIPHENHYDRAMINE HYDROCHLORIDE 50 MG/ML
50 INJECTION INTRAMUSCULAR; INTRAVENOUS ONCE AS NEEDED
Status: DISCONTINUED | OUTPATIENT
Start: 2021-11-30 | End: 2021-11-30 | Stop reason: HOSPADM

## 2021-11-30 RX ORDER — SODIUM CHLORIDE 9 MG/ML
30 INJECTION, SOLUTION INTRAVENOUS ONCE
Status: COMPLETED | OUTPATIENT
Start: 2021-11-30 | End: 2021-11-30

## 2021-11-30 RX ADMIN — SODIUM CHLORIDE 30 ML: 9 INJECTION, SOLUTION INTRAVENOUS at 16:25

## 2021-11-30 RX ADMIN — CASIRIVIMAB AND IMDEVIMAB: 600; 600 INJECTION, SOLUTION, CONCENTRATE INTRAVENOUS at 16:04

## 2022-02-14 DIAGNOSIS — G43.709 CHRONIC MIGRAINE WITHOUT AURA WITHOUT STATUS MIGRAINOSUS, NOT INTRACTABLE: ICD-10-CM

## 2022-02-14 RX ORDER — RIZATRIPTAN BENZOATE 10 MG/1
TABLET ORAL
Qty: 9 TABLET | Refills: 5 | Status: SHIPPED | OUTPATIENT
Start: 2022-02-14 | End: 2022-09-15

## 2022-03-08 ENCOUNTER — OFFICE VISIT (OUTPATIENT)
Dept: FAMILY MEDICINE CLINIC | Facility: CLINIC | Age: 57
End: 2022-03-08

## 2022-03-08 VITALS
OXYGEN SATURATION: 98 % | WEIGHT: 210.1 LBS | DIASTOLIC BLOOD PRESSURE: 78 MMHG | HEIGHT: 66 IN | HEART RATE: 68 BPM | BODY MASS INDEX: 33.77 KG/M2 | RESPIRATION RATE: 16 BRPM | TEMPERATURE: 97.4 F | SYSTOLIC BLOOD PRESSURE: 128 MMHG

## 2022-03-08 DIAGNOSIS — Z13.6 SCREENING FOR ISCHEMIC HEART DISEASE: ICD-10-CM

## 2022-03-08 DIAGNOSIS — E66.09 CLASS 1 OBESITY DUE TO EXCESS CALORIES WITH SERIOUS COMORBIDITY AND BODY MASS INDEX (BMI) OF 33.0 TO 33.9 IN ADULT: Primary | ICD-10-CM

## 2022-03-08 DIAGNOSIS — G47.34 NOCTURNAL HYPOXIA: ICD-10-CM

## 2022-03-08 PROBLEM — F32.A DEPRESSIVE DISORDER: Status: ACTIVE | Noted: 2021-10-11

## 2022-03-08 PROBLEM — G43.909 MIGRAINE: Status: ACTIVE | Noted: 2021-10-11

## 2022-03-08 PROBLEM — Z87.442 HISTORY OF RENAL CALCULI: Status: ACTIVE | Noted: 2021-10-11

## 2022-03-08 PROBLEM — K58.9 IRRITABLE BOWEL SYNDROME: Status: ACTIVE | Noted: 2021-10-11

## 2022-03-08 PROBLEM — Z87.42 HISTORY OF ABNORMAL CERVICAL PAPANICOLAOU SMEAR: Status: ACTIVE | Noted: 2021-10-11

## 2022-03-08 PROBLEM — K21.9 GASTROESOPHAGEAL REFLUX DISEASE: Status: ACTIVE | Noted: 2021-10-11

## 2022-03-08 PROCEDURE — 99214 OFFICE O/P EST MOD 30 MIN: CPT | Performed by: NURSE PRACTITIONER

## 2022-03-08 RX ORDER — PHENTERMINE HYDROCHLORIDE 37.5 MG/1
37.5 TABLET ORAL
Qty: 30 TABLET | Refills: 0 | Status: SHIPPED | OUTPATIENT
Start: 2022-03-08 | End: 2022-04-05 | Stop reason: SDUPTHER

## 2022-03-08 NOTE — PROGRESS NOTES
Subjective   Blaire Sullivan is a 56 y.o. female.     has Insomnia; Osteoarthritis of both knees; KENIA (generalized anxiety disorder); Depression with anxiety; Coxitis; Osteoarthritis of hip; Hyperlipidemia; Displacement of lumbar intervertebral disc without myelopathy; Right lumbar radiculopathy; Spinal stenosis of lumbar region; Low back pain with sciatica; FARHANA on CPAP; Gastroesophageal reflux disease; History of abnormal cervical Papanicolaou smear; History of renal calculi; Irritable bowel syndrome with diarrhea; Migraine without aura and without status migrainosus, not intractable; Depressive disorder; and Class 1 obesity due to excess calories with serious comorbidity and body mass index (BMI) of 33.0 to 33.9 in adult on their problem list.     Chief Complaint   Patient presents with   • Sleep Apnea        History of Present Illness   Presents for class 1 obesity related to excess calories with serious comorbidities. Body mass index is 33.91 kg/m².  Would like to try phentermine for medically assisted weight loss. Advised of risks and benefits as well as my protocol for prescribing phentermine. This includes:  Increase intake of water to 6-8 glasses per day.  Cut out concentrated sugars and reduce simple carbs.  Count calories, measure servings  Use a smart phone ford to help with counting calories and tracking weight loss, healthy lifestyle modifications.     Demonstrate at least a 1-2 pound / month weight loss with a healthy BP to continue on phentermine    Be aware that my routine practice is to allow qualified persons take phentermine for 3 months consecutively, then a one month holiday off it to prove the presence of lifestyle changes which are sustained, and may repeat this cycle as long as qualified and appropriate for phentermine.    If BMI falls below 27, the cycle will not continue   she wishes to continue. Will get screening EKG for ischemic heart disease due to age.    No other complaints today.      Past Surgical History:   Procedure Laterality Date   • CARPAL TUNNEL RELEASE Bilateral    • COLONOSCOPY  2013   • HYSTERECTOMY     • INJECTION OF MEDICATION  2015    Dexamethasone (1)      • INJECTION OF MEDICATION  2015    Rocephin (1)   • JOINT REPLACEMENT Bilateral     hips   • LAPAROTOMY EXPLORATORY FOR ECTOPIC PREGNANCY     • MOUTH SURGERY  2021   • SPINE SURGERY     • SUBTOTAL HYSTERECTOMY  2011   • TUBAL ABDOMINAL LIGATION        Social History     Socioeconomic History   • Marital status:      Spouse name: Biju   • Number of children: 0   Tobacco Use   • Smoking status: Former Smoker     Packs/day: 0.50     Years: 27.00     Pack years: 13.50     Types: Cigarettes     Quit date: 2012     Years since quittin.2   • Smokeless tobacco: Never Used   • Tobacco comment: Smoked on and off over 27 years, quit at times up to a year.   Substance and Sexual Activity   • Alcohol use: Yes     Types: 1 Glasses of wine per week     Comment: Usually do not drink weekly, maybe sevral drinks per month   • Drug use: No   • Sexual activity: Yes     Partners: Male     Birth control/protection: None      The following portions of the patient's history were reviewed and updated as appropriate: allergies, current medications, past family history, past medical history, past social history, past surgical history and problem list.    Review of Systems   Constitutional: Negative.    HENT: Negative.  Negative for congestion.    Eyes: Negative.  Negative for blurred vision, double vision, photophobia and visual disturbance.   Respiratory: Negative.  Negative for cough, shortness of breath, wheezing and stridor.    Cardiovascular: Negative.  Negative for chest pain, palpitations and leg swelling.   Gastrointestinal: Negative.  Negative for abdominal distention, abdominal pain, blood in stool, constipation, diarrhea, nausea, vomiting, GERD and indigestion.   Endocrine: Negative.  Negative for cold  intolerance and heat intolerance.   Genitourinary: Negative.  Negative for dysuria, flank pain, frequency and urinary incontinence.   Musculoskeletal: Negative.  Negative for arthralgias and back pain.   Skin: Negative.    Allergic/Immunologic: Negative.  Negative for immunocompromised state.   Neurological: Negative.    Hematological: Negative.    Psychiatric/Behavioral: Positive for depressed mood. Negative for agitation, behavioral problems, decreased concentration, dysphoric mood, hallucinations, self-injury, sleep disturbance, suicidal ideas, negative for hyperactivity and stress. The patient is nervous/anxious.    All other systems reviewed and are negative.    PHQ-9 Depression Screening  Little interest or pleasure in doing things?     Feeling down, depressed, or hopeless?     Trouble falling or staying asleep, or sleeping too much?     Feeling tired or having little energy?     Poor appetite or overeating?     Feeling bad about yourself - or that you are a failure or have let yourself or your family down?     Trouble concentrating on things, such as reading the newspaper or watching television?     Moving or speaking so slowly that other people could have noticed? Or the opposite - being so fidgety or restless that you have been moving around a lot more than usual?     Thoughts that you would be better off dead, or of hurting yourself in some way?     PHQ-9 Total Score     If you checked off any problems, how difficult have these problems made it for you to do your work, take care of things at home, or get along with other people?      Patient understands the risks associated with this controlled medication, including tolerance and addiction.  Patient also agrees to only obtain this medication from me, and not from a another provider, unless that provider is covering for me in my absence.  Patient also agrees to be compliant in dosing, and not self adjust the dose of medication.  A signed controlled  substance agreement is on file, and the patient has received a controlled substance education sheet at this a previous visit.  The patient has also signed a consent for treatment with a controlled substance as per [facility] policy. EMPERATRIZ was obtained.    Objective      ECG 12 Lead    Date/Time: 3/11/2022 4:31 PM  Performed by: Jeannie Werner APRN  Authorized by: Jeannie Werner APRN   Comparison: not compared with previous ECG   Previous ECG: no previous ECG available  Rhythm: sinus rhythm  Rate: normal  Conduction comments: RSR pattern in V1 may be normal, present in V2-3 with normal QRS interval, may be equivocal. Nonspecific.  ST Segments: ST segments normal  T Waves: T waves normal  QRS axis: normal    Clinical impression: abnormal EKG  Comments: Sinus rhythm with low amplitude in V leads, likely due to poor adherence of leads secondary to lotions. Nothing acute noted. No previous tracing for comparison.             Physical Exam  Vitals and nursing note reviewed.   Constitutional:       General: She is not in acute distress.     Appearance: Normal appearance. She is well-developed. She is obese. She is not ill-appearing or diaphoretic.   HENT:      Head: Normocephalic and atraumatic.   Eyes:      General: No scleral icterus.        Right eye: No discharge.         Left eye: No discharge.      Conjunctiva/sclera: Conjunctivae normal.      Pupils: Pupils are equal, round, and reactive to light.   Neck:      Thyroid: No thyromegaly.      Vascular: No carotid bruit or JVD.      Trachea: No tracheal deviation.   Cardiovascular:      Rate and Rhythm: Normal rate and regular rhythm.      Pulses: Normal pulses.      Heart sounds: Normal heart sounds. No murmur heard.    No friction rub. No gallop.   Pulmonary:      Effort: Pulmonary effort is normal. No respiratory distress.      Breath sounds: Normal breath sounds. No stridor. No wheezing, rhonchi or rales.   Chest:      Chest wall: No tenderness.  "  Abdominal:      General: Bowel sounds are normal.      Palpations: Abdomen is soft.   Musculoskeletal:         General: No tenderness or deformity. Normal range of motion.      Cervical back: Normal range of motion and neck supple. No rigidity or tenderness.      Right lower leg: No edema.      Left lower leg: No edema.   Lymphadenopathy:      Cervical: No cervical adenopathy.   Skin:     General: Skin is warm and dry.      Capillary Refill: Capillary refill takes 2 to 3 seconds.      Coloration: Skin is not jaundiced or pale.      Findings: No bruising, erythema, lesion or rash.   Neurological:      General: No focal deficit present.      Mental Status: She is alert and oriented to person, place, and time. Mental status is at baseline.      Motor: No weakness.      Gait: Gait normal.   Psychiatric:         Mood and Affect: Mood normal.         Behavior: Behavior normal.         Thought Content: Thought content normal.         Judgment: Judgment normal.       Vitals:    03/08/22 1339   BP: 128/78   BP Location: Left arm   Patient Position: Sitting   Cuff Size: Adult   Pulse: 68   Resp: 16   Temp: 97.4 °F (36.3 °C)   SpO2: 98%   Weight: 95.3 kg (210 lb 1.6 oz)   Height: 167.6 cm (66\")   PainSc: 0-No pain      Body mass index is 33.91 kg/m².      Assessment/Plan   Diagnoses and all orders for this visit:    1. Class 1 obesity due to excess calories with serious comorbidity and body mass index (BMI) of 33.0 to 33.9 in adult (Primary)  -     phentermine (ADIPEX-P) 37.5 MG tablet; Take 1 tablet by mouth Every Morning Before Breakfast.  Dispense: 30 tablet; Refill: 0    2. Screening for ischemic heart disease  Comments:  EKG NSR. RSR pattern in V2-3 but with normal QRS interval. nonspecific. no sign of ischemia.  Orders:  -     ECG 12 Lead    3. Nocturnal hypoxia  Comments:  resolved with treatment of FARHANA w/ CPAP, may cancel nocturnal supplemental oxygen therapy as of this date.               Return in about 12 weeks " (around 5/31/2022).  There are no Patient Instructions on file for this visit.        This document has been electronically signed by ALBERTO Glez on March 13, 2022 17:54 CDT

## 2022-03-11 ENCOUNTER — TELEPHONE (OUTPATIENT)
Dept: FAMILY MEDICINE CLINIC | Facility: CLINIC | Age: 57
End: 2022-03-11

## 2022-03-11 PROCEDURE — 93005 ELECTROCARDIOGRAM TRACING: CPT | Performed by: NURSE PRACTITIONER

## 2022-03-11 NOTE — TELEPHONE ENCOUNTER
Spoke with [facility] to discontinue the oxygen that's no longer needed they are going to pick the equipment

## 2022-03-11 NOTE — TELEPHONE ENCOUNTER
----- Message from ALBERTO Montez sent at 3/11/2022  4:22 PM CST -----  Regarding: please call [facility] and inform them we need night time oxygen discontinued. on CPAP no longer needs it.  Thanks see if they need a written order to discontinue?  Crystal

## 2022-03-13 PROBLEM — Z87.442 HISTORY OF RENAL CALCULI: Chronic | Status: ACTIVE | Noted: 2021-10-11

## 2022-03-13 PROBLEM — M48.061 SPINAL STENOSIS OF LUMBAR REGION: Chronic | Status: ACTIVE | Noted: 2018-05-30

## 2022-03-13 PROBLEM — G47.33 OSA ON CPAP: Chronic | Status: ACTIVE | Noted: 2021-11-12

## 2022-03-13 PROBLEM — K58.9 IRRITABLE BOWEL SYNDROME: Chronic | Status: ACTIVE | Noted: 2021-10-11

## 2022-03-13 PROBLEM — Z99.89 OSA ON CPAP: Chronic | Status: ACTIVE | Noted: 2021-11-12

## 2022-03-13 PROBLEM — F32.A DEPRESSIVE DISORDER: Chronic | Status: ACTIVE | Noted: 2021-10-11

## 2022-03-13 PROBLEM — G47.34 NOCTURNAL HYPOXIA: Status: ACTIVE | Noted: 2022-03-13

## 2022-03-13 PROBLEM — E78.5 HYPERLIPIDEMIA: Chronic | Status: ACTIVE | Noted: 2017-04-03

## 2022-03-13 PROBLEM — K21.9 GASTROESOPHAGEAL REFLUX DISEASE: Chronic | Status: ACTIVE | Noted: 2021-10-11

## 2022-03-13 PROBLEM — G47.34 NOCTURNAL HYPOXIA: Status: RESOLVED | Noted: 2022-03-13 | Resolved: 2022-03-13

## 2022-03-13 PROBLEM — G43.909 MIGRAINE: Chronic | Status: ACTIVE | Noted: 2021-10-11

## 2022-03-13 PROBLEM — K58.0 IRRITABLE BOWEL SYNDROME WITH DIARRHEA: Chronic | Status: ACTIVE | Noted: 2021-10-11

## 2022-03-13 PROBLEM — E66.09 CLASS 1 OBESITY DUE TO EXCESS CALORIES WITH SERIOUS COMORBIDITY AND BODY MASS INDEX (BMI) OF 33.0 TO 33.9 IN ADULT: Status: ACTIVE | Noted: 2022-03-13

## 2022-03-13 PROBLEM — Z87.42 HISTORY OF ABNORMAL CERVICAL PAPANICOLAOU SMEAR: Chronic | Status: ACTIVE | Noted: 2021-10-11

## 2022-03-13 PROBLEM — G43.009 MIGRAINE WITHOUT AURA AND WITHOUT STATUS MIGRAINOSUS, NOT INTRACTABLE: Chronic | Status: ACTIVE | Noted: 2021-10-11

## 2022-04-05 ENCOUNTER — OFFICE VISIT (OUTPATIENT)
Dept: FAMILY MEDICINE CLINIC | Facility: CLINIC | Age: 57
End: 2022-04-05

## 2022-04-05 ENCOUNTER — TELEPHONE (OUTPATIENT)
Dept: FAMILY MEDICINE CLINIC | Facility: CLINIC | Age: 57
End: 2022-04-05

## 2022-04-05 VITALS
HEART RATE: 69 BPM | OXYGEN SATURATION: 99 % | RESPIRATION RATE: 16 BRPM | WEIGHT: 195.2 LBS | TEMPERATURE: 97.6 F | BODY MASS INDEX: 31.37 KG/M2 | SYSTOLIC BLOOD PRESSURE: 124 MMHG | HEIGHT: 66 IN | DIASTOLIC BLOOD PRESSURE: 76 MMHG

## 2022-04-05 DIAGNOSIS — E66.09 CLASS 1 OBESITY DUE TO EXCESS CALORIES WITH SERIOUS COMORBIDITY AND BODY MASS INDEX (BMI) OF 31.0 TO 31.9 IN ADULT: Chronic | ICD-10-CM

## 2022-04-05 PROCEDURE — 99214 OFFICE O/P EST MOD 30 MIN: CPT | Performed by: NURSE PRACTITIONER

## 2022-04-05 RX ORDER — PHENTERMINE HYDROCHLORIDE 37.5 MG/1
37.5 TABLET ORAL
Qty: 30 TABLET | Refills: 0 | Status: SHIPPED | OUTPATIENT
Start: 2022-04-05 | End: 2022-05-02 | Stop reason: SDUPTHER

## 2022-04-05 RX ORDER — BUPROPION HYDROCHLORIDE 200 MG/1
200 TABLET, EXTENDED RELEASE ORAL 2 TIMES DAILY
COMMUNITY
Start: 2022-03-21

## 2022-04-05 NOTE — PROGRESS NOTES
Subjective   Blaire Sullivan is a 56 y.o. female.     has Insomnia; Osteoarthritis of both knees; KENIA (generalized anxiety disorder); Depression with anxiety; Coxitis; Osteoarthritis of hip; Hyperlipidemia; Displacement of lumbar intervertebral disc without myelopathy; Right lumbar radiculopathy; Spinal stenosis of lumbar region; Low back pain with sciatica; FARHANA on CPAP; Gastroesophageal reflux disease; History of abnormal cervical Papanicolaou smear; History of renal calculi; Irritable bowel syndrome with diarrhea; Migraine without aura and without status migrainosus, not intractable; Depressive disorder; and Class 1 obesity due to excess calories with serious comorbidity and body mass index (BMI) of 33.0 to 33.9 in adult on their problem list.     Chief Complaint   Patient presents with   • Weight Check        History of Present Illness   Presents for class 1 obesity related to excess calories with serious comorbidities. Body mass index is 31.51 kg/m².  Beginning weight was 210, 1.6oz on 3/8/22.  Weight today is 195 #, 3.2oz. Ht:66in.  She has lost 15# . This concludes first full month using phentermine for medically assisted weight loss.   She is qualified for month 2 of phentermine use. She wishes to proceed.  Reports tracking calories on [employer] ford and keeping between 1390-1500kcal. Does not feel hungry. No adverse effects of med.  Advised of risks and benefits as well as my protocol for prescribing phentermine. This includes:  Increase intake of water to 6-8 glasses per day.  Cut out concentrated sugars and reduce simple carbs.  Count calories, measure servings  Use a smart phone ford to help with counting calories and tracking weight loss, healthy lifestyle modifications.      Demonstrate at least a 1-2 pound / month weight loss with a healthy BP to continue on phentermine     Be aware that my routine practice is to allow qualified persons take phentermine for 3 months consecutively, then a one  month holiday off it to prove the presence of lifestyle changes which are sustained, and may repeat this cycle as long as qualified and appropriate for phentermine.     If BMI falls below 27, the cycle will not continue   she wishes to continue. Will get screening EKG for ischemic heart disease due to age.     No other complaints today.   Past Surgical History:   Procedure Laterality Date   • CARPAL TUNNEL RELEASE Bilateral    • COLONOSCOPY  2013   • HYSTERECTOMY     • INJECTION OF MEDICATION  2015    Dexamethasone (1)      • INJECTION OF MEDICATION  2015    Rocephin (1)   • JOINT REPLACEMENT Bilateral     hips   • LAPAROTOMY EXPLORATORY FOR ECTOPIC PREGNANCY     • MOUTH SURGERY  2021   • SPINE SURGERY     • SUBTOTAL HYSTERECTOMY  2011   • TUBAL ABDOMINAL LIGATION        Social History     Socioeconomic History   • Marital status:      Spouse name: Biju   • Number of children: 0   Tobacco Use   • Smoking status: Former Smoker     Packs/day: 0.50     Years: 27.00     Pack years: 13.50     Types: Cigarettes     Quit date: 2012     Years since quittin.3   • Smokeless tobacco: Never Used   • Tobacco comment: Smoked on and off over 27 years, quit at times up to a year.   Substance and Sexual Activity   • Alcohol use: Yes     Types: 1 Glasses of wine per week     Comment: Usually do not drink weekly, maybe sevral drinks per month   • Drug use: No   • Sexual activity: Yes     Partners: Male     Birth control/protection: None      The following portions of the patient's history were reviewed and updated as appropriate: allergies, current medications, past family history, past medical history, past social history, past surgical history and problem list.    Review of Systems   Constitutional: Negative.    HENT: Negative.  Negative for congestion.    Eyes: Negative.  Negative for blurred vision, double vision, photophobia and visual disturbance.   Respiratory: Negative.  Negative for  cough, shortness of breath, wheezing and stridor.    Cardiovascular: Negative.  Negative for chest pain, palpitations and leg swelling.   Gastrointestinal: Negative.  Negative for abdominal distention, abdominal pain, blood in stool, constipation, diarrhea, nausea, vomiting, GERD and indigestion.   Endocrine: Negative.  Negative for cold intolerance and heat intolerance.   Genitourinary: Negative.  Negative for dysuria, flank pain, frequency and urinary incontinence.   Musculoskeletal: Negative.  Negative for arthralgias and back pain.   Skin: Negative.    Allergic/Immunologic: Negative.  Negative for immunocompromised state.   Neurological: Negative.    Hematological: Negative.    Psychiatric/Behavioral: Negative.  Negative for agitation, behavioral problems, decreased concentration, dysphoric mood, hallucinations, self-injury, sleep disturbance, suicidal ideas, negative for hyperactivity, depressed mood and stress. The patient is not nervous/anxious.    All other systems reviewed and are negative.    PHQ-9 Depression Screening  Little interest or pleasure in doing things?     Feeling down, depressed, or hopeless?     Trouble falling or staying asleep, or sleeping too much?     Feeling tired or having little energy?     Poor appetite or overeating?     Feeling bad about yourself - or that you are a failure or have let yourself or your family down?     Trouble concentrating on things, such as reading the newspaper or watching television?     Moving or speaking so slowly that other people could have noticed? Or the opposite - being so fidgety or restless that you have been moving around a lot more than usual?     Thoughts that you would be better off dead, or of hurting yourself in some way?     PHQ-9 Total Score     If you checked off any problems, how difficult have these problems made it for you to do your work, take care of things at home, or get along with other people?        Objective   Physical Exam  Vitals  and nursing note reviewed.   Constitutional:       General: She is not in acute distress.     Appearance: Normal appearance. She is well-developed. She is obese. She is not ill-appearing or diaphoretic.   HENT:      Head: Normocephalic and atraumatic.   Eyes:      General: No scleral icterus.        Right eye: No discharge.         Left eye: No discharge.      Conjunctiva/sclera: Conjunctivae normal.      Pupils: Pupils are equal, round, and reactive to light.   Neck:      Thyroid: No thyromegaly.      Vascular: No carotid bruit or JVD.      Trachea: No tracheal deviation.   Cardiovascular:      Rate and Rhythm: Normal rate and regular rhythm.      Pulses: Normal pulses.      Heart sounds: Normal heart sounds. No murmur heard.    No friction rub. No gallop.   Pulmonary:      Effort: Pulmonary effort is normal. No respiratory distress.      Breath sounds: Normal breath sounds. No stridor. No wheezing, rhonchi or rales.   Chest:      Chest wall: No tenderness.   Abdominal:      General: Bowel sounds are normal.      Palpations: Abdomen is soft.   Musculoskeletal:         General: No tenderness or deformity. Normal range of motion.      Cervical back: Normal range of motion and neck supple. No rigidity or tenderness.      Right lower leg: No edema.      Left lower leg: No edema.   Lymphadenopathy:      Cervical: No cervical adenopathy.   Skin:     General: Skin is warm and dry.      Capillary Refill: Capillary refill takes 2 to 3 seconds.      Coloration: Skin is not jaundiced or pale.      Findings: No bruising, erythema, lesion or rash.   Neurological:      General: No focal deficit present.      Mental Status: She is alert and oriented to person, place, and time. Mental status is at baseline.      Motor: No weakness.      Gait: Gait normal.   Psychiatric:         Mood and Affect: Mood normal.         Behavior: Behavior normal.         Thought Content: Thought content normal.         Judgment: Judgment normal.  "      Vitals:    04/05/22 1319   BP: 124/76   BP Location: Left arm   Patient Position: Sitting   Cuff Size: Adult   Pulse: 69   Resp: 16   Temp: 97.6 °F (36.4 °C)   SpO2: 99%   Weight: 88.5 kg (195 lb 3.2 oz)   Height: 167.6 cm (66\")   PainSc: 0-No pain      Body mass index is 31.51 kg/m².    Assessment/Plan   Diagnoses and all orders for this visit:    1. Class 1 obesity due to excess calories with serious comorbidity and body mass index (BMI) of 31.0 to 31.9 in adult  Comments:  improving with phentermine, medically supervised weight loss.  Orders:  -     phentermine (ADIPEX-P) 37.5 MG tablet; Take 1 tablet by mouth Every Morning Before Breakfast.  Dispense: 30 tablet; Refill: 0        No follow-ups on file.  There are no Patient Instructions on file for this visit.             This document has been electronically signed by ALBERTO Glez on April 5, 2022 13:43 CDT    "

## 2022-04-05 NOTE — TELEPHONE ENCOUNTER
Spoke with Lino from [facility] about picking up the oxygen equipment he stated that a ticket was not put in to pick it up but he made one today and stated that they will get it picked up

## 2022-04-28 ENCOUNTER — LAB (OUTPATIENT)
Dept: LAB | Facility: OTHER | Age: 57
End: 2022-04-28

## 2022-04-28 DIAGNOSIS — E78.5 HYPERLIPIDEMIA, UNSPECIFIED HYPERLIPIDEMIA TYPE: Primary | ICD-10-CM

## 2022-04-28 DIAGNOSIS — E55.9 VITAMIN D DEFICIENCY: ICD-10-CM

## 2022-04-28 DIAGNOSIS — E66.09 CLASS 1 OBESITY DUE TO EXCESS CALORIES WITH SERIOUS COMORBIDITY AND BODY MASS INDEX (BMI) OF 33.0 TO 33.9 IN ADULT: ICD-10-CM

## 2022-04-28 LAB
ALBUMIN SERPL-MCNC: 4.5 G/DL (ref 3.5–5)
ALBUMIN/GLOB SERPL: 1.6 G/DL (ref 1.1–1.8)
ALP SERPL-CCNC: 82 U/L (ref 38–126)
ALT SERPL W P-5'-P-CCNC: 34 U/L
ANION GAP SERPL CALCULATED.3IONS-SCNC: 9 MMOL/L (ref 5–15)
AST SERPL-CCNC: 35 U/L (ref 14–36)
BASOPHILS # BLD AUTO: 0.06 10*3/MM3 (ref 0–0.2)
BASOPHILS NFR BLD AUTO: 0.9 % (ref 0–1.5)
BILIRUB SERPL-MCNC: 0.4 MG/DL (ref 0.2–1.3)
BUN SERPL-MCNC: 12 MG/DL (ref 7–23)
BUN/CREAT SERPL: 13 (ref 7–25)
CALCIUM SPEC-SCNC: 10 MG/DL (ref 8.4–10.2)
CHLORIDE SERPL-SCNC: 102 MMOL/L (ref 101–112)
CO2 SERPL-SCNC: 27 MMOL/L (ref 22–30)
CREAT SERPL-MCNC: 0.92 MG/DL (ref 0.52–1.04)
DEPRECATED RDW RBC AUTO: 42.8 FL (ref 37–54)
EGFRCR SERPLBLD CKD-EPI 2021: 73.7 ML/MIN/1.73
EOSINOPHIL # BLD AUTO: 0.2 10*3/MM3 (ref 0–0.4)
EOSINOPHIL NFR BLD AUTO: 3.1 % (ref 0.3–6.2)
ERYTHROCYTE [DISTWIDTH] IN BLOOD BY AUTOMATED COUNT: 13.7 % (ref 12.3–15.4)
GLOBULIN UR ELPH-MCNC: 2.9 GM/DL (ref 2.3–3.5)
GLUCOSE SERPL-MCNC: 102 MG/DL (ref 70–99)
HCT VFR BLD AUTO: 41.6 % (ref 34–46.6)
HGB BLD-MCNC: 13.9 G/DL (ref 12–15.9)
LYMPHOCYTES # BLD AUTO: 2.22 10*3/MM3 (ref 0.7–3.1)
LYMPHOCYTES NFR BLD AUTO: 34.4 % (ref 19.6–45.3)
MCH RBC QN AUTO: 29 PG (ref 26.6–33)
MCHC RBC AUTO-ENTMCNC: 33.4 G/DL (ref 31.5–35.7)
MCV RBC AUTO: 86.8 FL (ref 79–97)
MONOCYTES # BLD AUTO: 0.5 10*3/MM3 (ref 0.1–0.9)
MONOCYTES NFR BLD AUTO: 7.8 % (ref 5–12)
NEUTROPHILS NFR BLD AUTO: 3.47 10*3/MM3 (ref 1.7–7)
NEUTROPHILS NFR BLD AUTO: 53.8 % (ref 42.7–76)
PLATELET # BLD AUTO: 311 10*3/MM3 (ref 140–450)
PMV BLD AUTO: 9.7 FL (ref 6–12)
POTASSIUM SERPL-SCNC: 4.5 MMOL/L (ref 3.4–5)
PROT SERPL-MCNC: 7.4 G/DL (ref 6.3–8.6)
RBC # BLD AUTO: 4.79 10*6/MM3 (ref 3.77–5.28)
SODIUM SERPL-SCNC: 138 MMOL/L (ref 137–145)
WBC NRBC COR # BLD: 6.45 10*3/MM3 (ref 3.4–10.8)

## 2022-04-28 PROCEDURE — 82306 VITAMIN D 25 HYDROXY: CPT | Performed by: NURSE PRACTITIONER

## 2022-04-28 PROCEDURE — 36415 COLL VENOUS BLD VENIPUNCTURE: CPT | Performed by: NURSE PRACTITIONER

## 2022-04-28 PROCEDURE — 85025 COMPLETE CBC W/AUTO DIFF WBC: CPT | Performed by: NURSE PRACTITIONER

## 2022-04-28 PROCEDURE — 80053 COMPREHEN METABOLIC PANEL: CPT | Performed by: NURSE PRACTITIONER

## 2022-04-29 LAB — 25(OH)D3 SERPL-MCNC: 95 NG/ML (ref 30–100)

## 2022-05-02 ENCOUNTER — OFFICE VISIT (OUTPATIENT)
Dept: FAMILY MEDICINE CLINIC | Facility: CLINIC | Age: 57
End: 2022-05-02

## 2022-05-02 VITALS
HEART RATE: 88 BPM | BODY MASS INDEX: 30.13 KG/M2 | WEIGHT: 187.5 LBS | HEIGHT: 66 IN | OXYGEN SATURATION: 99 % | DIASTOLIC BLOOD PRESSURE: 80 MMHG | SYSTOLIC BLOOD PRESSURE: 128 MMHG | TEMPERATURE: 97.7 F | RESPIRATION RATE: 16 BRPM

## 2022-05-02 DIAGNOSIS — E55.9 VITAMIN D DEFICIENCY: ICD-10-CM

## 2022-05-02 DIAGNOSIS — E78.2 MIXED HYPERLIPIDEMIA: Primary | ICD-10-CM

## 2022-05-02 DIAGNOSIS — E66.09 CLASS 1 OBESITY DUE TO EXCESS CALORIES WITH SERIOUS COMORBIDITY AND BODY MASS INDEX (BMI) OF 30.0 TO 30.9 IN ADULT: ICD-10-CM

## 2022-05-02 PROCEDURE — 99214 OFFICE O/P EST MOD 30 MIN: CPT | Performed by: NURSE PRACTITIONER

## 2022-05-02 RX ORDER — PRAZOSIN HYDROCHLORIDE 2 MG/1
2 CAPSULE ORAL NIGHTLY
COMMUNITY
Start: 2022-04-19

## 2022-05-02 RX ORDER — PHENTERMINE HYDROCHLORIDE 37.5 MG/1
37.5 TABLET ORAL
Qty: 30 TABLET | Refills: 0 | Status: SHIPPED | OUTPATIENT
Start: 2022-05-02 | End: 2022-06-27 | Stop reason: SDUPTHER

## 2022-05-02 NOTE — PROGRESS NOTES
Subjective   Blaire Sullivan is a 56 y.o. female.     has Insomnia; Osteoarthritis of both knees; KENIA (generalized anxiety disorder); Depression with anxiety; Coxitis; Osteoarthritis of hip; Hyperlipidemia; Displacement of lumbar intervertebral disc without myelopathy; Right lumbar radiculopathy; Spinal stenosis of lumbar region; Low back pain with sciatica; FARHANA on CPAP; Gastroesophageal reflux disease; History of abnormal cervical Papanicolaou smear; History of renal calculi; Irritable bowel syndrome with diarrhea; Migraine without aura and without status migrainosus, not intractable; Depressive disorder; Class 1 obesity due to excess calories with serious comorbidity and body mass index (BMI) of 31.0 to 31.9 in adult; and Vitamin D deficiency on their problem list.     Chief Complaint   Patient presents with   • Weight Check   • lab results         History of Present Illness     Obesity improving with phentermine. Has lst additional 5 # this month, total now of 23# in 2 months on phentermine. This will be her 3rd consecutive month on phentermine then a required 4 week holiday will be observed during June and May return about 6/27 to have assessment to requalify for next round of phentermine.     Labs reviewed. Stopped D3 as level is high normal, repeat in 3 months.  Lipid due at next visit.     Past Surgical History:   Procedure Laterality Date   • CARPAL TUNNEL RELEASE Bilateral    • COLONOSCOPY  09/2013   • HYSTERECTOMY     • INJECTION OF MEDICATION  05/21/2015    Dexamethasone (1)      • INJECTION OF MEDICATION  05/21/2015    Rocephin (1)   • JOINT REPLACEMENT Bilateral     hips   • LAPAROTOMY EXPLORATORY FOR ECTOPIC PREGNANCY     • MOUTH SURGERY  08/30/2021   • SPINE SURGERY     • SUBTOTAL HYSTERECTOMY  02/2011   • TUBAL ABDOMINAL LIGATION        Social History     Socioeconomic History   • Marital status:      Spouse name: Biju   • Number of children: 0   Tobacco Use   • Smoking status: Former  Smoker     Packs/day: 0.50     Years: 27.00     Pack years: 13.50     Types: Cigarettes     Quit date: 2012     Years since quittin.4   • Smokeless tobacco: Never Used   • Tobacco comment: Smoked on and off over 27 years, quit at times up to a year.   Substance and Sexual Activity   • Alcohol use: Yes     Types: 1 Glasses of wine per week     Comment: Usually do not drink weekly, maybe sevral drinks per month   • Drug use: No   • Sexual activity: Yes     Partners: Male     Birth control/protection: None      The following portions of the patient's history were reviewed and updated as appropriate: allergies, current medications, past family history, past medical history, past social history, past surgical history and problem list.    Review of Systems   Constitutional: Negative.    HENT: Negative.  Negative for congestion.    Eyes: Negative.  Negative for blurred vision, double vision, photophobia and visual disturbance.   Respiratory: Negative.  Negative for cough, shortness of breath, wheezing and stridor.    Cardiovascular: Negative.  Negative for chest pain, palpitations and leg swelling.   Gastrointestinal: Negative.  Negative for abdominal distention, abdominal pain, blood in stool, constipation, diarrhea, nausea, vomiting, GERD and indigestion.   Endocrine: Negative.  Negative for cold intolerance and heat intolerance.   Genitourinary: Negative.  Negative for dysuria, flank pain, frequency and urinary incontinence.   Musculoskeletal: Negative.  Negative for arthralgias and back pain.   Skin: Negative.    Allergic/Immunologic: Negative.  Negative for immunocompromised state.   Neurological: Negative.    Hematological: Negative.    Psychiatric/Behavioral: Negative.  Negative for agitation, behavioral problems, decreased concentration, dysphoric mood, hallucinations, self-injury, sleep disturbance, suicidal ideas, negative for hyperactivity, depressed mood and stress. The patient is not nervous/anxious.     All other systems reviewed and are negative.    PHQ-9 Depression Screening  Little interest or pleasure in doing things?     Feeling down, depressed, or hopeless?     Trouble falling or staying asleep, or sleeping too much?     Feeling tired or having little energy?     Poor appetite or overeating?     Feeling bad about yourself - or that you are a failure or have let yourself or your family down?     Trouble concentrating on things, such as reading the newspaper or watching television?     Moving or speaking so slowly that other people could have noticed? Or the opposite - being so fidgety or restless that you have been moving around a lot more than usual?     Thoughts that you would be better off dead, or of hurting yourself in some way?     PHQ-9 Total Score     If you checked off any problems, how difficult have these problems made it for you to do your work, take care of things at home, or get along with other people?      Patient understands the risks associated with this controlled medication, including tolerance and addiction.  Patient also agrees to only obtain this medication from me, and not from a another provider, unless that provider is covering for me in my absence.  Patient also agrees to be compliant in dosing, and not self adjust the dose of medication.  A signed controlled substance agreement is on file, and the patient has received a controlled substance education sheet at this a previous visit.  The patient has also signed a consent for treatment with a controlled substance as per [facility] policy. EMPERATRIZ was obtained.    Objective   Physical Exam  Vitals and nursing note reviewed.   Constitutional:       General: She is not in acute distress.     Appearance: Normal appearance. She is well-developed. She is obese. She is not ill-appearing or diaphoretic.   HENT:      Head: Normocephalic and atraumatic.   Eyes:      General: No scleral icterus.        Right eye: No discharge.         Left  "eye: No discharge.      Conjunctiva/sclera: Conjunctivae normal.      Pupils: Pupils are equal, round, and reactive to light.   Neck:      Thyroid: No thyromegaly.      Vascular: No carotid bruit or JVD.      Trachea: No tracheal deviation.   Cardiovascular:      Rate and Rhythm: Normal rate and regular rhythm.      Pulses: Normal pulses.      Heart sounds: Normal heart sounds. No murmur heard.    No friction rub. No gallop.   Pulmonary:      Effort: Pulmonary effort is normal. No respiratory distress.      Breath sounds: Normal breath sounds. No stridor. No wheezing, rhonchi or rales.   Chest:      Chest wall: No tenderness.   Abdominal:      General: Bowel sounds are normal.      Palpations: Abdomen is soft.   Musculoskeletal:         General: No tenderness or deformity. Normal range of motion.      Cervical back: Normal range of motion and neck supple. No rigidity or tenderness.      Right lower leg: No edema.      Left lower leg: No edema.   Lymphadenopathy:      Cervical: No cervical adenopathy.   Skin:     General: Skin is warm and dry.      Capillary Refill: Capillary refill takes 2 to 3 seconds.      Coloration: Skin is not jaundiced or pale.      Findings: No bruising, erythema, lesion or rash.   Neurological:      General: No focal deficit present.      Mental Status: She is alert and oriented to person, place, and time. Mental status is at baseline.      Motor: No weakness.      Gait: Gait normal.   Psychiatric:         Mood and Affect: Mood normal.         Behavior: Behavior normal.         Thought Content: Thought content normal.         Judgment: Judgment normal.       Vitals:    05/02/22 1413   BP: 128/80   BP Location: Left arm   Patient Position: Sitting   Cuff Size: Adult   Pulse: 88   Resp: 16   Temp: 97.7 °F (36.5 °C)   SpO2: 99%   Weight: 85 kg (187 lb 8 oz)   Height: 167.6 cm (66\")   PainSc: 0-No pain      Body mass index is 30.26 kg/m².      Assessment/Plan   Diagnoses and all orders for this " visit:    1. Mixed hyperlipidemia (Primary)  -     Lipid Panel; Future    2. Class 1 obesity due to excess calories with serious comorbidity and body mass index (BMI) of 30.0 to 30.9 in adult  Comments:  improving with phentermine, medically supervised weight loss.  Orders:  -     phentermine (ADIPEX-P) 37.5 MG tablet; Take 1 tablet by mouth Every Morning Before Breakfast.  Dispense: 30 tablet; Refill: 0    3. Vitamin D deficiency  -     Vitamin D 25 hydroxy; Future               Return in about 8 weeks (around 6/27/2022).  There are no Patient Instructions on file for this visit.        This document has been electronically signed by ALBERTO Gelz on May 2, 2022 14:31 CDT

## 2022-05-18 DIAGNOSIS — E78.2 MIXED HYPERLIPIDEMIA: ICD-10-CM

## 2022-05-18 DIAGNOSIS — E55.9 VITAMIN D DEFICIENCY: ICD-10-CM

## 2022-05-19 RX ORDER — ATORVASTATIN CALCIUM 80 MG/1
TABLET, FILM COATED ORAL
Qty: 90 TABLET | Refills: 1 | Status: SHIPPED | OUTPATIENT
Start: 2022-05-19

## 2022-05-19 RX ORDER — CHOLECALCIFEROL (VITAMIN D3) 1250 MCG
CAPSULE ORAL
Qty: 12 CAPSULE | Refills: 1 | OUTPATIENT
Start: 2022-05-19

## 2022-06-23 ENCOUNTER — LAB (OUTPATIENT)
Dept: LAB | Facility: OTHER | Age: 57
End: 2022-06-23

## 2022-06-23 DIAGNOSIS — E78.2 MIXED HYPERLIPIDEMIA: ICD-10-CM

## 2022-06-23 DIAGNOSIS — E55.9 VITAMIN D DEFICIENCY: ICD-10-CM

## 2022-06-23 LAB
CHOLEST SERPL-MCNC: 152 MG/DL (ref 150–200)
HDLC SERPL-MCNC: 61 MG/DL (ref 40–59)
LDLC SERPL CALC-MCNC: 74 MG/DL
LDLC/HDLC SERPL: 1.19 {RATIO} (ref 0–3.22)
TRIGL SERPL-MCNC: 92 MG/DL
VLDLC SERPL-MCNC: 17 MG/DL (ref 5–40)

## 2022-06-23 PROCEDURE — 36415 COLL VENOUS BLD VENIPUNCTURE: CPT | Performed by: NURSE PRACTITIONER

## 2022-06-23 PROCEDURE — 80061 LIPID PANEL: CPT | Performed by: NURSE PRACTITIONER

## 2022-06-23 PROCEDURE — 82306 VITAMIN D 25 HYDROXY: CPT | Performed by: NURSE PRACTITIONER

## 2022-06-24 LAB — 25(OH)D3 SERPL-MCNC: 70 NG/ML (ref 30–100)

## 2022-06-27 ENCOUNTER — OFFICE VISIT (OUTPATIENT)
Dept: FAMILY MEDICINE CLINIC | Facility: CLINIC | Age: 57
End: 2022-06-27

## 2022-06-27 VITALS
DIASTOLIC BLOOD PRESSURE: 80 MMHG | HEART RATE: 72 BPM | HEIGHT: 66 IN | RESPIRATION RATE: 16 BRPM | OXYGEN SATURATION: 99 % | TEMPERATURE: 97.2 F | WEIGHT: 172.7 LBS | BODY MASS INDEX: 27.76 KG/M2 | SYSTOLIC BLOOD PRESSURE: 122 MMHG

## 2022-06-27 DIAGNOSIS — E53.8 LOW SERUM VITAMIN B12: ICD-10-CM

## 2022-06-27 DIAGNOSIS — E66.3 OVERWEIGHT WITH BODY MASS INDEX (BMI) OF 27 TO 27.9 IN ADULT: ICD-10-CM

## 2022-06-27 DIAGNOSIS — E55.9 VITAMIN D DEFICIENCY: ICD-10-CM

## 2022-06-27 DIAGNOSIS — Z79.899 LONG-TERM USE OF HIGH-RISK MEDICATION: ICD-10-CM

## 2022-06-27 DIAGNOSIS — E78.2 MIXED HYPERLIPIDEMIA: Primary | ICD-10-CM

## 2022-06-27 PROCEDURE — 99213 OFFICE O/P EST LOW 20 MIN: CPT | Performed by: NURSE PRACTITIONER

## 2022-06-27 RX ORDER — PHENTERMINE HYDROCHLORIDE 37.5 MG/1
37.5 TABLET ORAL
Qty: 30 TABLET | Refills: 0 | Status: SHIPPED | OUTPATIENT
Start: 2022-06-27 | End: 2022-08-03 | Stop reason: SDUPTHER

## 2022-06-27 NOTE — PROGRESS NOTES
Subjective   Blaire Sullivan is a 56 y.o. female.     has Insomnia; Osteoarthritis of both knees; KENIA (generalized anxiety disorder); Depression with anxiety; Coxitis; Osteoarthritis of hip; Hyperlipidemia; Displacement of lumbar intervertebral disc without myelopathy; Right lumbar radiculopathy; Spinal stenosis of lumbar region; Low back pain with sciatica; FARHANA on CPAP; Gastroesophageal reflux disease; History of abnormal cervical Papanicolaou smear; History of renal calculi; Irritable bowel syndrome with diarrhea; Migraine without aura and without status migrainosus, not intractable; Depressive disorder; Class 1 obesity due to excess calories with serious comorbidity and body mass index (BMI) of 31.0 to 31.9 in adult; and Vitamin D deficiency on their problem list.     Chief Complaint   Patient presents with   • Weight Check        History of Present Illness   Overweight status BMI 27.9 today weight is 172 pounds with 11.2 ounces.  Height 5 foot 6.  She presents today for reassessment for use of phentermine for 1 more month.  She has lost an additional 15 pounds since last month down from 187 and 8 ounces.  Initial weight was 210 for total weight loss of 38 pounds as of today.  Today she presents after 1 month holiday off of phentermine.  Advised this is her last prescription as her BMI is above 27 now but should not be after 1 more month of phentermine.  She states understanding.    Other complaints today: None.    Parts of most recent relevant visit HPI, ROS  and PE may be carried forward and all are updated as appropriate for current situation.    Past Surgical History:   Procedure Laterality Date   • CARPAL TUNNEL RELEASE Bilateral    • COLONOSCOPY  09/2013   • HYSTERECTOMY     • INJECTION OF MEDICATION  05/21/2015    Dexamethasone (1)      • INJECTION OF MEDICATION  05/21/2015    Rocephin (1)   • JOINT REPLACEMENT Bilateral     hips   • LAPAROTOMY EXPLORATORY FOR ECTOPIC PREGNANCY     • MOUTH SURGERY   2021   • SPINE SURGERY     • SUBTOTAL HYSTERECTOMY  2011   • TUBAL ABDOMINAL LIGATION        Social History     Socioeconomic History   • Marital status:      Spouse name: Biju   • Number of children: 0   Tobacco Use   • Smoking status: Former Smoker     Packs/day: 0.50     Years: 27.00     Pack years: 13.50     Types: Cigarettes     Quit date: 2012     Years since quittin.5   • Smokeless tobacco: Never Used   • Tobacco comment: Smoked on and off over 27 years, quit at times up to a year.   Substance and Sexual Activity   • Alcohol use: Yes     Types: 1 Glasses of wine per week     Comment: Usually do not drink weekly, maybe sevral drinks per month   • Drug use: No   • Sexual activity: Yes     Partners: Male     Birth control/protection: None      The following portions of the patient's history were reviewed and updated as appropriate: allergies, current medications, past family history, past medical history, past social history, past surgical history and problem list.    Review of Systems   Constitutional: Negative.    HENT: Negative.  Negative for congestion.    Eyes: Negative.  Negative for blurred vision, double vision, photophobia and visual disturbance.   Respiratory: Negative.  Negative for cough, shortness of breath, wheezing and stridor.    Cardiovascular: Negative.  Negative for chest pain, palpitations and leg swelling.   Gastrointestinal: Negative.  Negative for abdominal distention, abdominal pain, blood in stool, constipation, diarrhea, nausea, vomiting, GERD and indigestion.   Endocrine: Negative.  Negative for cold intolerance and heat intolerance.   Genitourinary: Negative.  Negative for dysuria, flank pain, frequency and urinary incontinence.   Musculoskeletal: Negative.  Negative for arthralgias and back pain.   Skin: Negative.    Allergic/Immunologic: Negative.  Negative for immunocompromised state.   Neurological: Negative.    Hematological: Negative.     Psychiatric/Behavioral: Negative.  Negative for agitation, behavioral problems, decreased concentration, dysphoric mood, hallucinations, self-injury, sleep disturbance, suicidal ideas, negative for hyperactivity, depressed mood and stress. The patient is not nervous/anxious.    All other systems reviewed and are negative.    PHQ-9 Depression Screening  Little interest or pleasure in doing things?     Feeling down, depressed, or hopeless?     Trouble falling or staying asleep, or sleeping too much?     Feeling tired or having little energy?     Poor appetite or overeating?     Feeling bad about yourself - or that you are a failure or have let yourself or your family down?     Trouble concentrating on things, such as reading the newspaper or watching television?     Moving or speaking so slowly that other people could have noticed? Or the opposite - being so fidgety or restless that you have been moving around a lot more than usual?     Thoughts that you would be better off dead, or of hurting yourself in some way?     PHQ-9 Total Score     If you checked off any problems, how difficult have these problems made it for you to do your work, take care of things at home, or get along with other people?      Patient understands the risks associated with this controlled medication, including tolerance and addiction.  Patient also agrees to only obtain this medication from me, and not from a another provider, unless that provider is covering for me in my absence.  Patient also agrees to be compliant in dosing, and not self adjust the dose of medication.  A signed controlled substance agreement is on file, and the patient has received a controlled substance education sheet at this a previous visit.  The patient has also signed a consent for treatment with a controlled substance as per [facility] policy. EMPERATRIZ was obtained.    Objective   Physical Exam  Vitals and nursing note reviewed.   Constitutional:       General:  She is not in acute distress.     Appearance: Normal appearance. She is well-developed. She is not ill-appearing or diaphoretic.      Comments: Overweight, improving   HENT:      Head: Normocephalic and atraumatic.   Eyes:      General: No scleral icterus.        Right eye: No discharge.         Left eye: No discharge.      Conjunctiva/sclera: Conjunctivae normal.      Pupils: Pupils are equal, round, and reactive to light.   Neck:      Thyroid: No thyromegaly.      Vascular: No carotid bruit or JVD.      Trachea: No tracheal deviation.   Cardiovascular:      Rate and Rhythm: Normal rate and regular rhythm.      Pulses: Normal pulses.      Heart sounds: Normal heart sounds. No murmur heard.    No friction rub. No gallop.   Pulmonary:      Effort: Pulmonary effort is normal. No respiratory distress.      Breath sounds: Normal breath sounds. No stridor. No wheezing, rhonchi or rales.   Chest:      Chest wall: No tenderness.   Abdominal:      General: Bowel sounds are normal.      Palpations: Abdomen is soft.   Musculoskeletal:         General: No tenderness or deformity. Normal range of motion.      Cervical back: Normal range of motion and neck supple. No rigidity or tenderness.      Right lower leg: No edema.      Left lower leg: No edema.   Lymphadenopathy:      Cervical: No cervical adenopathy.   Skin:     General: Skin is warm and dry.      Capillary Refill: Capillary refill takes 2 to 3 seconds.      Coloration: Skin is not jaundiced or pale.      Findings: No bruising, erythema, lesion or rash.   Neurological:      General: No focal deficit present.      Mental Status: She is alert and oriented to person, place, and time. Mental status is at baseline.      Motor: No weakness.      Gait: Gait normal.   Psychiatric:         Mood and Affect: Mood normal.         Behavior: Behavior normal.         Thought Content: Thought content normal.         Judgment: Judgment normal.       Vitals:    06/27/22 1346   BP: 122/80  "  BP Location: Left arm   Patient Position: Sitting   Cuff Size: Adult   Pulse: 72   Resp: 16   Temp: 97.2 °F (36.2 °C)   SpO2: 99%   Weight: 78.3 kg (172 lb 11.2 oz)   Height: 167.6 cm (66\")   PainSc: 0-No pain      Body mass index is 27.87 kg/m².  Total 38# weight loss, 15 in past 2 months including a month off of phentermine. Body mass index is 27.87 kg/m².  Phentermine prescribed for month 1 of 3 possible, advised must weigh more than 167 to qualify for next month of prescription phentermine, so no need to return if continues weight loss below that point. Will recheck lipids, cbc, cmp and vit B12 and D in 3 months.     Assessment & Plan   Diagnoses and all orders for this visit:    1. Mixed hyperlipidemia (Primary)  -     Lipid Panel; Future    2. Overweight with body mass index (BMI) of 27 to 27.9 in adult  Comments:  improving with phentermine, medically supervised weight loss.  Orders:  -     phentermine (ADIPEX-P) 37.5 MG tablet; Take 1 tablet by mouth Every Morning Before Breakfast.  Dispense: 30 tablet; Refill: 0  -     CBC & Differential; Future  -     Comprehensive Metabolic Panel; Future    3. Vitamin D deficiency  -     Vitamin D 25 Hydroxy; Future    4. Low serum vitamin B12  -     Vitamin B12 & Folate; Future    5. Long-term use of high-risk medication  -     CBC & Differential; Future  -     Comprehensive Metabolic Panel; Future               Return in 6 months (on 12/27/2022), or if symptoms worsen or fail to improve, for Next scheduled follow up.  There are no Patient Instructions on file for this visit.        This document has been electronically signed by ALBERTO Glez on June 27, 2022 14:21 CDT    "

## 2022-08-02 ENCOUNTER — OFFICE VISIT (OUTPATIENT)
Dept: FAMILY MEDICINE CLINIC | Facility: CLINIC | Age: 57
End: 2022-08-02

## 2022-08-02 VITALS
DIASTOLIC BLOOD PRESSURE: 78 MMHG | OXYGEN SATURATION: 98 % | RESPIRATION RATE: 16 BRPM | HEIGHT: 66 IN | HEART RATE: 89 BPM | BODY MASS INDEX: 26.97 KG/M2 | SYSTOLIC BLOOD PRESSURE: 120 MMHG | TEMPERATURE: 98 F | WEIGHT: 167.8 LBS

## 2022-08-02 DIAGNOSIS — E66.3 OVERWEIGHT WITH BODY MASS INDEX (BMI) OF 27 TO 27.9 IN ADULT: Primary | ICD-10-CM

## 2022-08-02 DIAGNOSIS — E66.3 OVERWEIGHT WITH BODY MASS INDEX (BMI) OF 27 TO 27.9 IN ADULT: ICD-10-CM

## 2022-08-02 PROCEDURE — 99213 OFFICE O/P EST LOW 20 MIN: CPT | Performed by: PHYSICIAN ASSISTANT

## 2022-08-02 RX ORDER — PHENTERMINE HYDROCHLORIDE 37.5 MG/1
37.5 TABLET ORAL
Qty: 30 TABLET | Refills: 0 | Status: CANCELLED | OUTPATIENT
Start: 2022-08-02

## 2022-08-02 NOTE — PROGRESS NOTES
"Subjective   Blaire Sullivan is a 56 y.o. female.     History of Present Illness      Pt presents today for a follow up on her desire for weight loss. PCP ALBERTO Whitfield. She has a history of obesity. Current weight 167 pounds with 12.8 ounces. Height 66\". She presents today for reassessment for use of phentermine for 1 more month. She has lost an additional 5 pounds since last month, down from 172 pounds with 11.2 ounces. Initial weight was 210 for total weight loss of 43 pounds. Advised this is her last prescription as her BMI is above 27 now but should not be after 1 more month of phentermine.  She states understanding. She denies side effects or adverse effects with phentermine use. She denies chest pain, dyspnea, syncope/presyncope, dizziness, lightheadedness, palpitations.    The following portions of the patient's history were reviewed and updated as appropriate: allergies, current medications, past family history, past medical history, past social history, past surgical history and problem list.    Review of Systems   Constitutional: Negative.    HENT: Negative.  Negative for congestion.    Eyes: Negative.  Negative for blurred vision, double vision, photophobia and visual disturbance.   Respiratory: Negative.  Negative for cough, shortness of breath, wheezing and stridor.    Cardiovascular: Negative.  Negative for chest pain, palpitations and leg swelling.   Gastrointestinal: Negative.  Negative for abdominal distention, abdominal pain, blood in stool, constipation, diarrhea, nausea, vomiting, GERD and indigestion.   Endocrine: Negative.  Negative for cold intolerance and heat intolerance.   Genitourinary: Negative.  Negative for dysuria, flank pain, frequency and urinary incontinence.   Musculoskeletal: Negative.  Negative for arthralgias and back pain.   Skin: Negative.    Allergic/Immunologic: Negative.  Negative for immunocompromised state.   Neurological: Negative.    Hematological: Negative.  "   Psychiatric/Behavioral: Negative.  Negative for agitation, behavioral problems, decreased concentration, dysphoric mood, hallucinations, self-injury, sleep disturbance, suicidal ideas, negative for hyperactivity, depressed mood and stress. The patient is not nervous/anxious.    All other systems reviewed and are negative.      Objective   Physical Exam  Vitals and nursing note reviewed.   Constitutional:       General: She is not in acute distress.     Appearance: Normal appearance. She is well-developed. She is not ill-appearing or diaphoretic.      Comments: Overweight, improving   HENT:      Head: Normocephalic and atraumatic.   Eyes:      General: No scleral icterus.        Right eye: No discharge.         Left eye: No discharge.      Conjunctiva/sclera: Conjunctivae normal.      Pupils: Pupils are equal, round, and reactive to light.   Neck:      Thyroid: No thyromegaly.      Vascular: No carotid bruit or JVD.      Trachea: No tracheal deviation.   Cardiovascular:      Rate and Rhythm: Normal rate and regular rhythm.      Pulses: Normal pulses.      Heart sounds: Normal heart sounds. No murmur heard.    No friction rub. No gallop.   Pulmonary:      Effort: Pulmonary effort is normal. No respiratory distress.      Breath sounds: Normal breath sounds. No stridor. No wheezing, rhonchi or rales.   Chest:      Chest wall: No tenderness.   Abdominal:      General: Bowel sounds are normal.      Palpations: Abdomen is soft.   Musculoskeletal:         General: No tenderness or deformity. Normal range of motion.      Cervical back: Normal range of motion and neck supple. No rigidity or tenderness.      Right lower leg: No edema.      Left lower leg: No edema.   Lymphadenopathy:      Cervical: No cervical adenopathy.   Skin:     General: Skin is warm and dry.      Capillary Refill: Capillary refill takes less than 2 seconds.      Coloration: Skin is not jaundiced or pale.      Findings: No bruising, erythema, lesion or  "rash.   Neurological:      General: No focal deficit present.      Mental Status: She is alert and oriented to person, place, and time. Mental status is at baseline.      Motor: No weakness.      Gait: Gait normal.   Psychiatric:         Mood and Affect: Mood normal.         Behavior: Behavior normal.         Thought Content: Thought content normal.         Judgment: Judgment normal.       Vitals:    08/02/22 1100   BP: 120/78   Pulse: 89   Resp: 16   Temp: 98 °F (36.7 °C)   SpO2: 98%        Assessment & Plan   Diagnoses and all orders for this visit:    1. Overweight with body mass index (BMI) of 27 to 27.9 in adult (Primary)      Overweight - chronic, improving control with phentermine assisted medically supervised weight loss. Current weight 167 lbs and 12.8 ounces. Height 66\". Body mass index is 27.08 kg/m². The following options were offered after discussion;: exercise counseling/recommendations, nutrition counseling/recommendations and pharmacological intervention options. She has demonstrated a 5 lb weight loss in the past month with a healthy BP. I did discuss with her that I do not have a [employer] license and will not be able to prescribe this for her. I will notify Ms. Werner's supervising physician for patient's request for refill of phentermine for her to review and refill as deemed appropriate. Patient understands the risks associated with this controlled medication, including tolerance and addiction.  she also agrees to only obtain this medication from her pcp Jeannie DOMINGUEZ or her supervising physician, and not from another provider, unless that provider is covering for her pcp in their absence. she also agrees to be compliant in dosing, and not self adjust the dose of medication. Discussed potential a/e and s/e of phentermine, pt verbalized understanding. Advised this is her last prescription as her BMI is above 27 now but should not be after 1 more month of phentermine. Follow up with pcpJeannie " APRN as scheduled in 6 months.    Patient educated to follow up in 6 months with PCP Crystal Werner APRN as scheduled or sooner than next scheduled appointment if needed. Patient stated understanding and has agreed with plan of care. After visit summary was printed and given to patient.      This document has been electronically signed by Marya Awad PA-C on August 2, 2022 17:49 CDT,.

## 2022-08-03 DIAGNOSIS — E66.3 OVERWEIGHT WITH BODY MASS INDEX (BMI) OF 27 TO 27.9 IN ADULT: ICD-10-CM

## 2022-08-03 RX ORDER — PHENTERMINE HYDROCHLORIDE 37.5 MG/1
37.5 TABLET ORAL
Qty: 30 TABLET | Refills: 0 | Status: SHIPPED | OUTPATIENT
Start: 2022-08-03 | End: 2023-02-17

## 2022-09-15 DIAGNOSIS — G43.709 CHRONIC MIGRAINE WITHOUT AURA WITHOUT STATUS MIGRAINOSUS, NOT INTRACTABLE: ICD-10-CM

## 2022-09-15 RX ORDER — RIZATRIPTAN BENZOATE 10 MG/1
TABLET ORAL
Qty: 9 TABLET | Refills: 5 | Status: SHIPPED | OUTPATIENT
Start: 2022-09-15

## 2022-10-17 DIAGNOSIS — E78.2 MIXED HYPERLIPIDEMIA: ICD-10-CM

## 2022-10-17 RX ORDER — ATORVASTATIN CALCIUM 80 MG/1
TABLET, FILM COATED ORAL
Qty: 90 TABLET | Refills: 1 | OUTPATIENT
Start: 2022-10-17